# Patient Record
Sex: FEMALE | Race: WHITE | NOT HISPANIC OR LATINO | Employment: UNEMPLOYED | URBAN - NONMETROPOLITAN AREA
[De-identification: names, ages, dates, MRNs, and addresses within clinical notes are randomized per-mention and may not be internally consistent; named-entity substitution may affect disease eponyms.]

---

## 2023-03-07 DIAGNOSIS — E11.9 TYPE 2 DIABETES MELLITUS WITHOUT COMPLICATION, UNSPECIFIED WHETHER LONG TERM INSULIN USE (MULTI): ICD-10-CM

## 2023-03-07 RX ORDER — IBUPROFEN 200 MG
CAPSULE ORAL
COMMUNITY
Start: 2021-10-25

## 2023-03-07 RX ORDER — CLINDAMYCIN PHOSPHATE 10 UG/ML
LOTION TOPICAL 2 TIMES DAILY
COMMUNITY
Start: 2020-03-03

## 2023-03-07 RX ORDER — FOLIC ACID 1 MG/1
1 TABLET ORAL DAILY
COMMUNITY
Start: 2019-10-30

## 2023-03-07 RX ORDER — DIAZEPAM 10 MG/1
TABLET ORAL
COMMUNITY
Start: 2022-06-22 | End: 2024-04-01 | Stop reason: ALTCHOICE

## 2023-03-07 RX ORDER — PREDNISONE 10 MG/1
1 TABLET ORAL DAILY
COMMUNITY
Start: 2018-11-28

## 2023-03-07 RX ORDER — NORETHINDRONE 0.35 MG/1
1 TABLET ORAL DAILY
COMMUNITY
Start: 2018-11-28 | End: 2024-05-08

## 2023-03-07 RX ORDER — LEVOTHYROXINE SODIUM 25 UG/1
1 TABLET ORAL DAILY
COMMUNITY
Start: 2021-10-20 | End: 2023-08-24 | Stop reason: SDUPTHER

## 2023-03-07 RX ORDER — LANOLIN ALCOHOL/MO/W.PET/CERES
1 CREAM (GRAM) TOPICAL DAILY
COMMUNITY
Start: 2018-11-28 | End: 2023-08-24 | Stop reason: SDUPTHER

## 2023-03-07 RX ORDER — ACETAMINOPHEN 500 MG
1 TABLET ORAL DAILY
COMMUNITY
Start: 2018-08-02 | End: 2023-04-11

## 2023-03-07 RX ORDER — METRONIDAZOLE 7.5 MG/G
GEL TOPICAL
COMMUNITY
Start: 2022-03-09

## 2023-03-07 RX ORDER — CHOLECALCIFEROL (VITAMIN D3) 25 MCG
1 TABLET ORAL DAILY
COMMUNITY
Start: 2018-08-02 | End: 2023-04-11 | Stop reason: SDUPTHER

## 2023-03-07 RX ORDER — DULAGLUTIDE 3 MG/.5ML
INJECTION, SOLUTION SUBCUTANEOUS
COMMUNITY
Start: 2021-10-20 | End: 2023-03-07 | Stop reason: SDUPTHER

## 2023-03-07 RX ORDER — PANCRELIPASE LIPASE, PANCRELIPASE PROTEASE, PANCRELIPASE AMYLASE 40000; 126000; 168000 [USP'U]/1; [USP'U]/1; [USP'U]/1
CAPSULE, DELAYED RELEASE ORAL
COMMUNITY
Start: 2020-07-23

## 2023-03-07 RX ORDER — MAGNESIUM CHLORIDE 64 MG
TABLET, DELAYED RELEASE (ENTERIC COATED) ORAL
COMMUNITY

## 2023-03-07 RX ORDER — ONDANSETRON HYDROCHLORIDE 8 MG/1
TABLET, FILM COATED ORAL 3 TIMES DAILY PRN
COMMUNITY
Start: 2019-07-18 | End: 2024-01-09 | Stop reason: SDUPTHER

## 2023-03-07 RX ORDER — OMEPRAZOLE 20 MG/1
CAPSULE, DELAYED RELEASE ORAL
COMMUNITY
End: 2023-04-11 | Stop reason: SDUPTHER

## 2023-03-07 RX ORDER — DULOXETIN HYDROCHLORIDE 60 MG/1
CAPSULE, DELAYED RELEASE ORAL
COMMUNITY
Start: 2020-04-28 | End: 2024-01-08 | Stop reason: SDUPTHER

## 2023-03-07 RX ORDER — CYCLOBENZAPRINE HCL 10 MG
TABLET ORAL
COMMUNITY
Start: 2022-01-27

## 2023-03-07 RX ORDER — SPIRONOLACTONE 25 MG/1
1 TABLET ORAL DAILY
COMMUNITY
Start: 2019-07-18 | End: 2023-04-11 | Stop reason: SDUPTHER

## 2023-03-07 RX ORDER — MYCOPHENOLATE MOFETIL 500 MG/1
TABLET ORAL
COMMUNITY
Start: 2020-10-25

## 2023-03-07 RX ORDER — METFORMIN HYDROCHLORIDE EXTENDED-RELEASE TABLETS 1000 MG/1
TABLET, FILM COATED, EXTENDED RELEASE ORAL 2 TIMES DAILY
COMMUNITY
End: 2023-08-24 | Stop reason: SDUPTHER

## 2023-03-07 RX ORDER — DULAGLUTIDE 3 MG/.5ML
INJECTION, SOLUTION SUBCUTANEOUS
Qty: 8 ML | Refills: 0 | Status: SHIPPED | OUTPATIENT
Start: 2023-03-09 | End: 2023-08-16 | Stop reason: SDUPTHER

## 2023-03-07 RX ORDER — DAPAGLIFLOZIN 5 MG/1
1 TABLET, FILM COATED ORAL DAILY
COMMUNITY
Start: 2022-05-18

## 2023-03-07 RX ORDER — METFORMIN HYDROCHLORIDE 500 MG/1
2 TABLET, FILM COATED, EXTENDED RELEASE ORAL
COMMUNITY
End: 2024-06-01 | Stop reason: WASHOUT

## 2023-03-07 RX ORDER — TOPIRAMATE 100 MG/1
TABLET, FILM COATED ORAL 2 TIMES DAILY
COMMUNITY
Start: 2021-12-07

## 2023-03-07 RX ORDER — MECLIZINE HCL 12.5 MG 12.5 MG/1
TABLET ORAL
COMMUNITY
Start: 2022-07-12

## 2023-03-07 NOTE — TELEPHONE ENCOUNTER
Pt called requesting a refill on her Trulicity.  Stated that her morning BS has been 100-115 with a low of 87; evening BS has been 120-150.  Rx proposed for 1.5mg pens; 2 pens one time weekly.

## 2023-03-13 DIAGNOSIS — E11.9 TYPE 2 DIABETES MELLITUS WITHOUT COMPLICATION, UNSPECIFIED WHETHER LONG TERM INSULIN USE (MULTI): ICD-10-CM

## 2023-04-11 DIAGNOSIS — E78.5 MILD HYPERLIPIDEMIA: ICD-10-CM

## 2023-04-11 DIAGNOSIS — E55.9 VITAMIN D DEFICIENCY: ICD-10-CM

## 2023-04-11 DIAGNOSIS — K21.9 CHRONIC GERD: ICD-10-CM

## 2023-04-11 DIAGNOSIS — I10 ESSENTIAL HYPERTENSION: ICD-10-CM

## 2023-04-11 RX ORDER — OMEPRAZOLE 20 MG/1
20 CAPSULE, DELAYED RELEASE ORAL DAILY
Qty: 90 CAPSULE | Refills: 0 | Status: SHIPPED | OUTPATIENT
Start: 2023-04-11 | End: 2023-08-24 | Stop reason: SDUPTHER

## 2023-04-11 RX ORDER — ATORVASTATIN CALCIUM 20 MG/1
20 TABLET, FILM COATED ORAL NIGHTLY
Qty: 90 TABLET | Refills: 0 | Status: SHIPPED | OUTPATIENT
Start: 2023-04-11 | End: 2023-10-12 | Stop reason: SDUPTHER

## 2023-04-11 RX ORDER — CHOLECALCIFEROL (VITAMIN D3) 25 MCG
25 TABLET ORAL DAILY
Qty: 90 TABLET | Refills: 0 | Status: SHIPPED | OUTPATIENT
Start: 2023-04-11

## 2023-04-11 RX ORDER — ATORVASTATIN CALCIUM 20 MG/1
1 TABLET, FILM COATED ORAL NIGHTLY
COMMUNITY
Start: 2021-10-20 | End: 2023-04-11 | Stop reason: SDUPTHER

## 2023-04-11 RX ORDER — SPIRONOLACTONE 25 MG/1
25 TABLET ORAL DAILY
Qty: 90 TABLET | Refills: 0 | Status: SHIPPED | OUTPATIENT
Start: 2023-04-11 | End: 2023-08-24 | Stop reason: SDUPTHER

## 2023-04-19 LAB
ALANINE AMINOTRANSFERASE (SGPT) (U/L) IN SER/PLAS: NORMAL
ALBUMIN (G/DL) IN SER/PLAS: NORMAL
ALKALINE PHOSPHATASE (U/L) IN SER/PLAS: NORMAL
ANION GAP IN SER/PLAS: NORMAL
ANTI-DNA (DS): <1 IU/ML
ASPARTATE AMINOTRANSFERASE (SGOT) (U/L) IN SER/PLAS: NORMAL
BASOPHILS (10*3/UL) IN BLOOD BY AUTOMATED COUNT: NORMAL
BASOPHILS/100 LEUKOCYTES IN BLOOD BY AUTOMATED COUNT: NORMAL
BILIRUBIN TOTAL (MG/DL) IN SER/PLAS: NORMAL
C REACTIVE PROTEIN (MG/L) IN SER/PLAS: 1.29 MG/DL
CALCIUM (MG/DL) IN SER/PLAS: NORMAL
CARBON DIOXIDE, TOTAL (MMOL/L) IN SER/PLAS: NORMAL
CHLORIDE (MMOL/L) IN SER/PLAS: NORMAL
COMPLEMENT C3 (MG/DL) IN SER/PLAS: 195 MG/DL (ref 87–200)
COMPLEMENT C4 (MG/DL) IN SER/PLAS: 61 MG/DL (ref 10–50)
CREATININE (MG/DL) IN SER/PLAS: NORMAL
EOSINOPHILS (10*3/UL) IN BLOOD BY AUTOMATED COUNT: NORMAL
EOSINOPHILS/100 LEUKOCYTES IN BLOOD BY AUTOMATED COUNT: NORMAL
ERYTHROCYTE DISTRIBUTION WIDTH (RATIO) BY AUTOMATED COUNT: NORMAL
ERYTHROCYTE MEAN CORPUSCULAR HEMOGLOBIN CONCENTRATION (G/DL) BY AUTOMATED: NORMAL
ERYTHROCYTE MEAN CORPUSCULAR VOLUME (FL) BY AUTOMATED COUNT: NORMAL
ERYTHROCYTES (10*6/UL) IN BLOOD BY AUTOMATED COUNT: NORMAL
GFR FEMALE: NORMAL
GFR MALE: NORMAL
GLUCOSE (MG/DL) IN SER/PLAS: NORMAL
HEMATOCRIT (%) IN BLOOD BY AUTOMATED COUNT: NORMAL
HEMOGLOBIN (G/DL) IN BLOOD: NORMAL
IMMATURE GRANULOCYTES/100 LEUKOCYTES IN BLOOD BY AUTOMATED COUNT: NORMAL
LEUKOCYTES (10*3/UL) IN BLOOD BY AUTOMATED COUNT: NORMAL
LYMPHOCYTES (10*3/UL) IN BLOOD BY AUTOMATED COUNT: NORMAL
LYMPHOCYTES/100 LEUKOCYTES IN BLOOD BY AUTOMATED COUNT: NORMAL
MANUAL DIFFERENTIAL Y/N: NORMAL
MONOCYTES (10*3/UL) IN BLOOD BY AUTOMATED COUNT: NORMAL
MONOCYTES/100 LEUKOCYTES IN BLOOD BY AUTOMATED COUNT: NORMAL
NEUTROPHILS (10*3/UL) IN BLOOD BY AUTOMATED COUNT: NORMAL
NEUTROPHILS/100 LEUKOCYTES IN BLOOD BY AUTOMATED COUNT: NORMAL
PLATELETS (10*3/UL) IN BLOOD AUTOMATED COUNT: NORMAL
POTASSIUM (MMOL/L) IN SER/PLAS: NORMAL
PROTEIN TOTAL: NORMAL
SEDIMENTATION RATE, ERYTHROCYTE: 22 MM/H (ref 0–20)
SODIUM (MMOL/L) IN SER/PLAS: NORMAL
UREA NITROGEN (MG/DL) IN SER/PLAS: NORMAL

## 2023-06-09 ENCOUNTER — HOSPITAL ENCOUNTER (OUTPATIENT)
Dept: DATA CONVERSION | Facility: HOSPITAL | Age: 43
End: 2023-06-09
Attending: PAIN MEDICINE | Admitting: PAIN MEDICINE

## 2023-06-09 DIAGNOSIS — E11.65 TYPE 2 DIABETES MELLITUS WITH HYPERGLYCEMIA (MULTI): ICD-10-CM

## 2023-06-09 DIAGNOSIS — R52 PAIN, UNSPECIFIED: ICD-10-CM

## 2023-06-09 DIAGNOSIS — E03.9 HYPOTHYROIDISM, UNSPECIFIED: ICD-10-CM

## 2023-06-09 DIAGNOSIS — K21.9 GASTRO-ESOPHAGEAL REFLUX DISEASE WITHOUT ESOPHAGITIS: ICD-10-CM

## 2023-06-09 DIAGNOSIS — E78.5 HYPERLIPIDEMIA, UNSPECIFIED: ICD-10-CM

## 2023-06-09 DIAGNOSIS — I10 ESSENTIAL (PRIMARY) HYPERTENSION: ICD-10-CM

## 2023-06-09 DIAGNOSIS — M32.9 SYSTEMIC LUPUS ERYTHEMATOSUS, UNSPECIFIED (MULTI): ICD-10-CM

## 2023-06-09 DIAGNOSIS — G47.33 OBSTRUCTIVE SLEEP APNEA (ADULT) (PEDIATRIC): ICD-10-CM

## 2023-06-09 DIAGNOSIS — M47.817 SPONDYLOSIS WITHOUT MYELOPATHY OR RADICULOPATHY, LUMBOSACRAL REGION: ICD-10-CM

## 2023-06-09 DIAGNOSIS — E55.9 VITAMIN D DEFICIENCY, UNSPECIFIED: ICD-10-CM

## 2023-08-16 DIAGNOSIS — E11.9 TYPE 2 DIABETES MELLITUS WITHOUT COMPLICATION, UNSPECIFIED WHETHER LONG TERM INSULIN USE (MULTI): ICD-10-CM

## 2023-08-16 RX ORDER — DULAGLUTIDE 3 MG/.5ML
INJECTION, SOLUTION SUBCUTANEOUS
Qty: 8 ML | Refills: 0 | Status: SHIPPED | OUTPATIENT
Start: 2023-08-16 | End: 2023-08-24 | Stop reason: SDUPTHER

## 2023-08-24 ENCOUNTER — LAB (OUTPATIENT)
Dept: LAB | Facility: LAB | Age: 43
End: 2023-08-24
Payer: MEDICARE

## 2023-08-24 ENCOUNTER — OFFICE VISIT (OUTPATIENT)
Dept: PRIMARY CARE | Facility: CLINIC | Age: 43
End: 2023-08-24
Payer: MEDICARE

## 2023-08-24 VITALS
WEIGHT: 230 LBS | BODY MASS INDEX: 40.75 KG/M2 | SYSTOLIC BLOOD PRESSURE: 104 MMHG | HEART RATE: 64 BPM | HEIGHT: 63 IN | DIASTOLIC BLOOD PRESSURE: 70 MMHG

## 2023-08-24 DIAGNOSIS — R10.2 SUPRAPUBIC ABDOMINAL PAIN: ICD-10-CM

## 2023-08-24 DIAGNOSIS — R14.2 BELCHING: ICD-10-CM

## 2023-08-24 DIAGNOSIS — E66.01 CLASS 3 SEVERE OBESITY DUE TO EXCESS CALORIES WITH SERIOUS COMORBIDITY AND BODY MASS INDEX (BMI) OF 40.0 TO 44.9 IN ADULT (MULTI): ICD-10-CM

## 2023-08-24 DIAGNOSIS — E03.9 HYPOTHYROIDISM, ADULT: Primary | ICD-10-CM

## 2023-08-24 DIAGNOSIS — Z00.00 MEDICARE ANNUAL WELLNESS VISIT, SUBSEQUENT: Primary | ICD-10-CM

## 2023-08-24 DIAGNOSIS — E53.8 B12 DEFICIENCY: ICD-10-CM

## 2023-08-24 DIAGNOSIS — E11.9 TYPE 2 DIABETES MELLITUS WITHOUT COMPLICATION, UNSPECIFIED WHETHER LONG TERM INSULIN USE (MULTI): ICD-10-CM

## 2023-08-24 DIAGNOSIS — D50.8 OTHER IRON DEFICIENCY ANEMIA: ICD-10-CM

## 2023-08-24 DIAGNOSIS — F33.1 MODERATE EPISODE OF RECURRENT MAJOR DEPRESSIVE DISORDER (MULTI): ICD-10-CM

## 2023-08-24 DIAGNOSIS — M32.9 SYSTEMIC LUPUS ERYTHEMATOSUS, UNSPECIFIED SLE TYPE, UNSPECIFIED ORGAN INVOLVEMENT STATUS (MULTI): ICD-10-CM

## 2023-08-24 DIAGNOSIS — K21.9 CHRONIC GERD: ICD-10-CM

## 2023-08-24 DIAGNOSIS — I10 ESSENTIAL HYPERTENSION: ICD-10-CM

## 2023-08-24 PROBLEM — E11.65 UNCONTROLLED TYPE 2 DIABETES MELLITUS WITH HYPERGLYCEMIA (MULTI): Status: ACTIVE | Noted: 2023-08-24

## 2023-08-24 PROBLEM — E61.1 IRON DEFICIENCY: Status: ACTIVE | Noted: 2023-08-24

## 2023-08-24 PROBLEM — K86.1 CHRONIC PANCREATITIS (MULTI): Status: RESOLVED | Noted: 2023-08-24 | Resolved: 2023-08-24

## 2023-08-24 PROBLEM — D72.829 LEUKOCYTOSIS: Status: ACTIVE | Noted: 2023-08-24

## 2023-08-24 PROBLEM — D50.9 IRON DEFICIENCY ANEMIA: Status: ACTIVE | Noted: 2023-08-24

## 2023-08-24 PROBLEM — K42.9 UMBILICAL HERNIA: Status: RESOLVED | Noted: 2023-08-24 | Resolved: 2023-08-24

## 2023-08-24 PROBLEM — M47.817 LUMBOSACRAL SPONDYLOSIS: Status: ACTIVE | Noted: 2023-08-24

## 2023-08-24 PROBLEM — F33.42 RECURRENT MAJOR DEPRESSIVE DISORDER, IN FULL REMISSION (CMS-HCC): Status: ACTIVE | Noted: 2023-08-24

## 2023-08-24 PROBLEM — K86.89 SECONDARY PANCREATIC INSUFFICIENCY (HHS-HCC): Status: RESOLVED | Noted: 2023-08-24 | Resolved: 2023-08-24

## 2023-08-24 PROBLEM — G47.33 OSA ON CPAP: Status: ACTIVE | Noted: 2023-08-24

## 2023-08-24 PROBLEM — M70.62 GREATER TROCHANTERIC BURSITIS, LEFT: Status: RESOLVED | Noted: 2023-08-24 | Resolved: 2023-08-24

## 2023-08-24 PROBLEM — K52.9 CHRONIC DIARRHEA: Status: ACTIVE | Noted: 2023-08-24

## 2023-08-24 PROBLEM — G43.009 MIGRAINE WITHOUT AURA AND WITHOUT STATUS MIGRAINOSUS, NOT INTRACTABLE: Status: ACTIVE | Noted: 2023-08-24

## 2023-08-24 PROBLEM — M54.17 LUMBOSACRAL RADICULOPATHY: Status: ACTIVE | Noted: 2023-08-24

## 2023-08-24 PROBLEM — E66.813 CLASS 3 SEVERE OBESITY DUE TO EXCESS CALORIES WITH SERIOUS COMORBIDITY AND BODY MASS INDEX (BMI) OF 40.0 TO 44.9 IN ADULT: Status: ACTIVE | Noted: 2023-08-24

## 2023-08-24 PROBLEM — L73.2 HIDRADENITIS SUPPURATIVA: Status: ACTIVE | Noted: 2022-03-09

## 2023-08-24 PROBLEM — M79.7 FIBROMYALGIA: Status: ACTIVE | Noted: 2023-08-24

## 2023-08-24 PROBLEM — E11.65 TYPE 2 DIABETES MELLITUS WITH HYPERGLYCEMIA, WITHOUT LONG-TERM CURRENT USE OF INSULIN (MULTI): Status: ACTIVE | Noted: 2022-05-17

## 2023-08-24 PROBLEM — I47.19 ATRIAL TACHYCARDIA (CMS-HCC): Status: ACTIVE | Noted: 2023-08-24

## 2023-08-24 PROBLEM — M46.1 SACROILIITIS (CMS-HCC): Status: RESOLVED | Noted: 2023-08-24 | Resolved: 2023-08-24

## 2023-08-24 PROBLEM — J32.9 CHRONIC SINUSITIS: Status: RESOLVED | Noted: 2023-08-24 | Resolved: 2023-08-24

## 2023-08-24 LAB
ALANINE AMINOTRANSFERASE (SGPT) (U/L) IN SER/PLAS: 12 U/L (ref 7–45)
ALBUMIN (G/DL) IN SER/PLAS: 4.4 G/DL (ref 3.4–5)
ALKALINE PHOSPHATASE (U/L) IN SER/PLAS: 94 U/L (ref 33–110)
ANION GAP IN SER/PLAS: 14 MMOL/L (ref 10–20)
ASPARTATE AMINOTRANSFERASE (SGOT) (U/L) IN SER/PLAS: 11 U/L (ref 9–39)
BASOPHILS (10*3/UL) IN BLOOD BY AUTOMATED COUNT: 0.06 X10E9/L (ref 0–0.1)
BASOPHILS/100 LEUKOCYTES IN BLOOD BY AUTOMATED COUNT: 0.5 % (ref 0–2)
BILIRUBIN TOTAL (MG/DL) IN SER/PLAS: 0.3 MG/DL (ref 0–1.2)
CALCIUM (MG/DL) IN SER/PLAS: 9.3 MG/DL (ref 8.6–10.3)
CARBON DIOXIDE, TOTAL (MMOL/L) IN SER/PLAS: 23 MMOL/L (ref 21–32)
CHLORIDE (MMOL/L) IN SER/PLAS: 107 MMOL/L (ref 98–107)
COBALAMIN (VITAMIN B12) (PG/ML) IN SER/PLAS: 420 PG/ML (ref 211–911)
CREATININE (MG/DL) IN SER/PLAS: 0.69 MG/DL (ref 0.5–1.05)
EOSINOPHILS (10*3/UL) IN BLOOD BY AUTOMATED COUNT: 0.48 X10E9/L (ref 0–0.7)
EOSINOPHILS/100 LEUKOCYTES IN BLOOD BY AUTOMATED COUNT: 3.9 % (ref 0–6)
ERYTHROCYTE DISTRIBUTION WIDTH (RATIO) BY AUTOMATED COUNT: 14.2 % (ref 11.5–14.5)
ERYTHROCYTE MEAN CORPUSCULAR HEMOGLOBIN CONCENTRATION (G/DL) BY AUTOMATED: 30.3 G/DL (ref 32–36)
ERYTHROCYTE MEAN CORPUSCULAR VOLUME (FL) BY AUTOMATED COUNT: 89 FL (ref 80–100)
ERYTHROCYTES (10*6/UL) IN BLOOD BY AUTOMATED COUNT: 4.92 X10E12/L (ref 4–5.2)
FERRITIN (UG/LL) IN SER/PLAS: 52 UG/L (ref 8–150)
FOLATE (NG/ML) IN SER/PLAS: 6.1 NG/ML
GFR FEMALE: >90 ML/MIN/1.73M2
GLUCOSE (MG/DL) IN SER/PLAS: 119 MG/DL (ref 74–99)
HEMATOCRIT (%) IN BLOOD BY AUTOMATED COUNT: 43.9 % (ref 36–46)
HEMOGLOBIN (G/DL) IN BLOOD: 13.3 G/DL (ref 12–16)
HEMOGLOBIN (PG) IN RETICULOCYTES: 31 PG (ref 28–38)
IMMATURE GRANULOCYTES/100 LEUKOCYTES IN BLOOD BY AUTOMATED COUNT: 0.3 % (ref 0–0.9)
IMMATURE RETIC FRACTION: 15.4 % (ref 0–16)
IRON (UG/DL) IN SER/PLAS: 34 UG/DL (ref 35–150)
IRON BINDING CAPACITY (UG/DL) IN SER/PLAS: 425 UG/DL (ref 240–445)
IRON SATURATION (%) IN SER/PLAS: 8 % (ref 25–45)
LEUKOCYTES (10*3/UL) IN BLOOD BY AUTOMATED COUNT: 12.2 X10E9/L (ref 4.4–11.3)
LYMPHOCYTES (10*3/UL) IN BLOOD BY AUTOMATED COUNT: 2.5 X10E9/L (ref 1.2–4.8)
LYMPHOCYTES/100 LEUKOCYTES IN BLOOD BY AUTOMATED COUNT: 20.5 % (ref 13–44)
MONOCYTES (10*3/UL) IN BLOOD BY AUTOMATED COUNT: 0.67 X10E9/L (ref 0.1–1)
MONOCYTES/100 LEUKOCYTES IN BLOOD BY AUTOMATED COUNT: 5.5 % (ref 2–10)
NEUTROPHILS (10*3/UL) IN BLOOD BY AUTOMATED COUNT: 8.47 X10E9/L (ref 1.2–7.7)
NEUTROPHILS/100 LEUKOCYTES IN BLOOD BY AUTOMATED COUNT: 69.3 % (ref 40–80)
PLATELETS (10*3/UL) IN BLOOD AUTOMATED COUNT: 511 X10E9/L (ref 150–450)
POC APPEARANCE, URINE: ABNORMAL
POC BILIRUBIN, URINE: NEGATIVE
POC BLOOD, URINE: ABNORMAL
POC COLOR, URINE: ABNORMAL
POC GLUCOSE, URINE: ABNORMAL MG/DL
POC KETONES, URINE: NEGATIVE MG/DL
POC LEUKOCYTES, URINE: ABNORMAL
POC NITRITE,URINE: NEGATIVE
POC PH, URINE: 5.5 PH
POC PROTEIN, URINE: ABNORMAL MG/DL
POC SPECIFIC GRAVITY, URINE: 1.02
POC UROBILINOGEN, URINE: 1 EU/DL
POTASSIUM (MMOL/L) IN SER/PLAS: 4.4 MMOL/L (ref 3.5–5.3)
PROTEIN TOTAL: 6.7 G/DL (ref 6.4–8.2)
RETICULOCYTES (10*3/UL) IN BLOOD: 0.08 X10E12/L (ref 0.02–0.08)
RETICULOCYTES/100 ERYTHROCYTES IN BLOOD BY AUTOMATED COUNT: 1.7 % (ref 0.5–2)
SODIUM (MMOL/L) IN SER/PLAS: 140 MMOL/L (ref 136–145)
UREA NITROGEN (MG/DL) IN SER/PLAS: 9 MG/DL (ref 6–23)

## 2023-08-24 PROCEDURE — 82728 ASSAY OF FERRITIN: CPT

## 2023-08-24 PROCEDURE — 83550 IRON BINDING TEST: CPT

## 2023-08-24 PROCEDURE — 81003 URINALYSIS AUTO W/O SCOPE: CPT | Performed by: NURSE PRACTITIONER

## 2023-08-24 PROCEDURE — 36415 COLL VENOUS BLD VENIPUNCTURE: CPT

## 2023-08-24 PROCEDURE — 82607 VITAMIN B-12: CPT

## 2023-08-24 PROCEDURE — 3078F DIAST BP <80 MM HG: CPT | Performed by: NURSE PRACTITIONER

## 2023-08-24 PROCEDURE — G0439 PPPS, SUBSEQ VISIT: HCPCS | Performed by: NURSE PRACTITIONER

## 2023-08-24 PROCEDURE — 82746 ASSAY OF FOLIC ACID SERUM: CPT

## 2023-08-24 PROCEDURE — 99214 OFFICE O/P EST MOD 30 MIN: CPT | Performed by: NURSE PRACTITIONER

## 2023-08-24 PROCEDURE — 3074F SYST BP LT 130 MM HG: CPT | Performed by: NURSE PRACTITIONER

## 2023-08-24 PROCEDURE — 80053 COMPREHEN METABOLIC PANEL: CPT

## 2023-08-24 PROCEDURE — 1123F ACP DISCUSS/DSCN MKR DOCD: CPT | Performed by: NURSE PRACTITIONER

## 2023-08-24 PROCEDURE — 85025 COMPLETE CBC W/AUTO DIFF WBC: CPT

## 2023-08-24 PROCEDURE — 87086 URINE CULTURE/COLONY COUNT: CPT

## 2023-08-24 PROCEDURE — 3008F BODY MASS INDEX DOCD: CPT | Performed by: NURSE PRACTITIONER

## 2023-08-24 PROCEDURE — 83540 ASSAY OF IRON: CPT

## 2023-08-24 PROCEDURE — 85045 AUTOMATED RETICULOCYTE COUNT: CPT

## 2023-08-24 RX ORDER — OMEPRAZOLE 20 MG/1
20 CAPSULE, DELAYED RELEASE ORAL DAILY
Qty: 90 CAPSULE | Refills: 1 | Status: SHIPPED | OUTPATIENT
Start: 2023-08-24

## 2023-08-24 RX ORDER — SPIRONOLACTONE 25 MG/1
25 TABLET ORAL DAILY
Qty: 90 TABLET | Refills: 1 | Status: SHIPPED | OUTPATIENT
Start: 2023-08-24

## 2023-08-24 RX ORDER — LEVOTHYROXINE SODIUM 25 UG/1
25 TABLET ORAL DAILY
Qty: 90 TABLET | Refills: 1 | Status: SHIPPED | OUTPATIENT
Start: 2023-08-24

## 2023-08-24 RX ORDER — DULAGLUTIDE 3 MG/.5ML
INJECTION, SOLUTION SUBCUTANEOUS
Qty: 8 ML | Refills: 0 | Status: SHIPPED | OUTPATIENT
Start: 2023-08-24 | End: 2023-11-30 | Stop reason: SDUPTHER

## 2023-08-24 RX ORDER — LANOLIN ALCOHOL/MO/W.PET/CERES
1000 CREAM (GRAM) TOPICAL DAILY
Qty: 90 TABLET | Refills: 1 | Status: SHIPPED | OUTPATIENT
Start: 2023-08-24

## 2023-08-24 RX ORDER — SIMETHICONE 80 MG
80 TABLET,CHEWABLE ORAL EVERY 6 HOURS PRN
Qty: 30 TABLET | Refills: 1 | Status: SHIPPED | OUTPATIENT
Start: 2023-08-24

## 2023-08-24 ASSESSMENT — ENCOUNTER SYMPTOMS
COLOR CHANGE: 0
LIGHT-HEADEDNESS: 0
PALPITATIONS: 0
BLOOD IN STOOL: 1
NAUSEA: 1
CONSTIPATION: 0
DIZZINESS: 0
ABDOMINAL PAIN: 1
FATIGUE: 0
SHORTNESS OF BREATH: 0
DIARRHEA: 0
DYSURIA: 0
RECTAL PAIN: 0
ANAL BLEEDING: 1
MYALGIAS: 0
VOMITING: 0
ABDOMINAL DISTENTION: 0
CHEST TIGHTNESS: 0
ARTHRALGIAS: 0
HEADACHES: 0

## 2023-08-24 ASSESSMENT — ACTIVITIES OF DAILY LIVING (ADL)
BATHING: INDEPENDENT
MANAGING_FINANCES: INDEPENDENT
GROCERY_SHOPPING: INDEPENDENT
TAKING_MEDICATION: INDEPENDENT
DRESSING: INDEPENDENT
DOING_HOUSEWORK: INDEPENDENT

## 2023-08-24 ASSESSMENT — PATIENT HEALTH QUESTIONNAIRE - PHQ9
4. FEELING TIRED OR HAVING LITTLE ENERGY: NEARLY EVERY DAY
2. FEELING DOWN, DEPRESSED OR HOPELESS: MORE THAN HALF THE DAYS
5. POOR APPETITE OR OVEREATING: NEARLY EVERY DAY
6. FEELING BAD ABOUT YOURSELF - OR THAT YOU ARE A FAILURE OR HAVE LET YOURSELF OR YOUR FAMILY DOWN: SEVERAL DAYS
7. TROUBLE CONCENTRATING ON THINGS, SUCH AS READING THE NEWSPAPER OR WATCHING TELEVISION: MORE THAN HALF THE DAYS
8. MOVING OR SPEAKING SO SLOWLY THAT OTHER PEOPLE COULD HAVE NOTICED. OR THE OPPOSITE, BEING SO FIGETY OR RESTLESS THAT YOU HAVE BEEN MOVING AROUND A LOT MORE THAN USUAL: MORE THAN HALF THE DAYS
SUM OF ALL RESPONSES TO PHQ QUESTIONS 1-9: 17
SUM OF ALL RESPONSES TO PHQ9 QUESTIONS 1 AND 2: 4
1. LITTLE INTEREST OR PLEASURE IN DOING THINGS: MORE THAN HALF THE DAYS
9. THOUGHTS THAT YOU WOULD BE BETTER OFF DEAD, OR OF HURTING YOURSELF: NOT AT ALL
3. TROUBLE FALLING OR STAYING ASLEEP OR SLEEPING TOO MUCH: MORE THAN HALF THE DAYS

## 2023-08-24 NOTE — PROGRESS NOTES
"Subjective   Reason for Visit: Ayde Ramírez is an 43 y.o. female here for a Medicare Wellness visit.     Past Medical, Surgical, and Family History reviewed and updated in chart.    Reviewed all medications by prescribing practitioner or clinical pharmacist (such as prescriptions, OTCs, herbal therapies and supplements) and documented in the medical record.    HPI  Ayde here for W visit and has other concerns today.          Also has complaints of blood in stool. Had scope in 2022-had a rectal tear. Not having problems with constipation. Has noticed blood clots in toilet. Is currently on period, but this happened pre menstrual cycle. Bright red blood. Denies any hematemesis.     Has appt with Dr. Joya in October 26th.     Was seen At [facility] on 8/10 and was told she had a UTI. Was given an ATB x 5 days. Never had a urinary symptoms except urinary leakage and had abdominal pain RLQ.     Patient Care Team:  MÓNICA Neves-CNP as PCP - General (Family Medicine)  Karen Celis MD as PCP - [employer] PCP  DILIA Sanderson as Nurse Practitioner (Hematology and Oncology)     Review of Systems   Constitutional:  Negative for fatigue.   Respiratory:  Negative for chest tightness and shortness of breath.    Cardiovascular:  Negative for chest pain, palpitations and leg swelling.   Gastrointestinal:  Positive for abdominal pain, anal bleeding, blood in stool and nausea (intermittent). Negative for abdominal distention, constipation, diarrhea, rectal pain and vomiting.   Genitourinary:  Positive for pelvic pain. Negative for dysuria.   Musculoskeletal:  Negative for arthralgias and myalgias.   Skin:  Negative for color change.   Neurological:  Negative for dizziness, light-headedness and headaches.       Objective   Vitals:  /70   Pulse 64   Ht 1.6 m (5' 3\")   Wt 104 kg (230 lb)   BMI 40.74 kg/m²       Physical Exam  Vitals and nursing note reviewed.   Constitutional:       " Appearance: Normal appearance.   Cardiovascular:      Rate and Rhythm: Normal rate and regular rhythm.      Heart sounds: Normal heart sounds.   Pulmonary:      Effort: Pulmonary effort is normal.      Breath sounds: Normal breath sounds.   Abdominal:      General: Bowel sounds are normal.      Palpations: Abdomen is soft.      Tenderness: There is abdominal tenderness in the suprapubic area. There is right CVA tenderness. There is no guarding or rebound.          Comments: Right mid abdominal pain   Skin:     General: Skin is warm and dry.   Neurological:      Mental Status: She is alert and oriented to person, place, and time.   Psychiatric:         Mood and Affect: Mood normal.         Behavior: Behavior normal.         Thought Content: Thought content normal.         Judgment: Judgment normal.         Assessment/Plan   Problem List Items Addressed This Visit       Moderate episode of recurrent major depressive disorder (CMS/HCC)    Current Assessment & Plan     Going through stressors with her mother in law and her own health conditions.  She currently is taking Cymbalta 60 mg daily         Systemic lupus erythematosus (CMS/HCC)    Current Assessment & Plan     Managed by Rheumatology, Dr. Celis         Class 3 severe obesity due to excess calories with serious comorbidity and body mass index (BMI) of 40.0 to 44.9 in adult (CMS/HCC)    Current Assessment & Plan     Pt advised to institute a healthy, well-balanced meal with portion control and to exercise daily for at least 30-60 minutes.         Iron deficiency anemia    Relevant Orders    CBC and Auto Differential    Comprehensive Metabolic Panel    Iron and TIBC    Reticulocytes    Vitamin B12    Folate    Ferritin     Other Visit Diagnoses       Medicare annual wellness visit, subsequent    -  Primary    Suprapubic abdominal pain        Relevant Orders    Urine Culture    POCT UA Automated manually resulted (Completed)    Belching        Relevant  Medications    simethicone (Gas Relief, simethicone,) 80 mg chewable tablet               Follow up in December for chronic care follow up. We will call her with results of labs, and urine.

## 2023-08-24 NOTE — ASSESSMENT & PLAN NOTE
Going through stressors with her mother in law and her own health conditions.  She currently is taking Cymbalta 60 mg daily

## 2023-08-24 NOTE — ACP (ADVANCE CARE PLANNING)
Confirming Previous Code Status:   Advance Care Planning Note     Discussion Date: 08/24/23   Discussion Participants: patient    The patient wishes to discuss Advance Care Planning today and the following is a brief summary of our discussion.     Patient has capacity to make their own medical decisions: Yes  Health Care Agent/Surrogate Decision Maker documented in chart: Yes - marilin Ramírez- spouse    Documents on file and valid:  Advance Directive/Living Will: No   Health Care Power of : No  Treatment Decisions  Goals of Care: survival is paramount regardless of prognosis, treatment outcome, or burden       Time Statement: Total face to face time spent on advance care planning was 1 minutes with 1 minutes spent in counseling, including the explanation.    MÓNICA Neves-CNP  8/24/2023 9:39 AM

## 2023-08-24 NOTE — PROGRESS NOTES
Patient here today for rectal bleeding, duration on week    seen when wiping, in toilet, and in stool

## 2023-08-24 NOTE — PATIENT INSTRUCTIONS
BMI was above normal measurement. Current weight: 104 kg (230 lb)  Weight change since last visit (-) denotes wt loss -5 lbs   Weight loss needed to achieve BMI 25: 89.2 Lbs  Weight loss needed to achieve BMI 30: 61 Lbs  Provided instructions on dietary changes  Provided instructions on exercise  Advised to Increase physical activity

## 2023-08-25 LAB — URINE CULTURE: NORMAL

## 2023-08-28 NOTE — RESULT ENCOUNTER NOTE
No bacterial growth in your urine. Blood counts aren't low, but Also, looks like your iron levels are low again. I will get a copy of your labs sent over to [facility]. Please make an appt for follow up if you haven't done so. If you are noticing more blood in your stool, let me know we can try to get your appt moved up with Dr. Perry. But it looks like your counts are stable at this time.

## 2023-09-07 VITALS — BODY MASS INDEX: 39.26 KG/M2 | WEIGHT: 221.56 LBS | HEIGHT: 63 IN

## 2023-09-29 RX ORDER — HEPARIN SODIUM,PORCINE/PF 10 UNIT/ML
50 SYRINGE (ML) INTRAVENOUS AS NEEDED
Status: CANCELLED | OUTPATIENT
Start: 2023-10-06

## 2023-09-29 RX ORDER — HEPARIN 100 UNIT/ML
500 SYRINGE INTRAVENOUS AS NEEDED
Status: CANCELLED | OUTPATIENT
Start: 2023-10-06

## 2023-10-02 NOTE — OP NOTE
PROCEDURE DETAILS    Preoperative Diagnosis:  Spondylosis without myelopathy or radiculopathy, lumbosacral region, M47.817    Postoperative Diagnosis:  Spondylosis without myelopathy or radiculopathy, lumbosacral region, M47.817    Surgeon: Kaz Zaman  Resident/Fellow/Other Assistant: None of these were associated with this case    Procedure:  1. BILATERAL L3-5 MBB    Anesthesia: No anesthesiologist associated with this case  Estimated Blood Loss: 0  Findings: NA  Additional Details: The patient has a greater than 3-month history of severe bilateral axial low back pain.  On exam the patient has bilateral lumbar paraspinal tenderness exacerbated with  facet loading at the levels in question.  The imaging is notable for spondylosis and facet arthropathy at the levels in question.  The patient does not have evidence of neurogenic claudication or radiculopathy.  The patient has previously failed 6 weeks  of conservative management with medications and exercise therapy.  The patient is compliant with home exercises for this issue.  The pain significantly impairs the patient's function.  She cannot sleep for 4 hours, lift anything other than very light  weights, or stand or walk for more than 5 minutes due to the pain.  The patient does not desire surgery. or spondylosis and facet arthropathy at the levels in question.  The patient has had an initial set of diagnostic injections with greater than 80%  immediate pain relief and functional improvement which lasted for 3-4 hours which is consistent with the duration of action of bupivacaine during which time the pain could not be reproduced.  During this time the patient could perform previously painful  movements without pain.         Operative Report:   Procedure: Bilateral diagnostic lumbar medial branch blocks under fluoroscopic guidance of the medial branches of L3-L5 covering the facet joints from L4-S1  Diagnosis: Lumbosacral spondylosis  Solution: 3 mL  of bupivacaine 0.5% total. 0.5 mL per branch  Anesthesia: Local  Complications: None    After informed consent was obtained the patient was brought to the OR and placed in the prone position. The area in question was prepped and draped  in sterile fashion. An ipsilateral oblique fluoroscopic view of the lumbosacral spine was obtained and a 25-gauge quincke needle was inserted into the skin and advanced to the junction of the superior articular process and transverse process of the L4  and L5 vertebrae and to the sacral ala on the left and right under intermittent fluoroscopic guidance. Proper needle position was confirmed by AP and ipsilateral oblique fluoroscopic views.  Aspiration at each site was negative. The local anesthetic solution  was injected at each site. The 6 needles were removed. Bleeding was nil. The patient tolerated the procedure well and was transferred to the recovery room in good condition.                        Attestation:   Note Completion:  Attending Attestation I performed the procedure without a resident         Electronic Signatures:  Kaz Zaman)  (Signed 09-Jun-2023 20:32)   Authored: Post-Operative Note, Chart Review, Note Completion      Last Updated: 09-Jun-2023 20:32 by Kaz Zaman)

## 2023-10-05 RX ORDER — AMLODIPINE BESYLATE 5 MG/1
5 TABLET ORAL DAILY
COMMUNITY
Start: 2023-08-25 | End: 2023-11-22

## 2023-10-05 RX ORDER — ATENOLOL 50 MG/1
3 TABLET ORAL DAILY
COMMUNITY
End: 2024-05-10 | Stop reason: SDUPTHER

## 2023-10-06 ENCOUNTER — INFUSION (OUTPATIENT)
Dept: HEMATOLOGY/ONCOLOGY | Facility: CLINIC | Age: 43
End: 2023-10-06
Payer: COMMERCIAL

## 2023-10-06 VITALS
TEMPERATURE: 97.7 F | HEART RATE: 90 BPM | BODY MASS INDEX: 39.25 KG/M2 | OXYGEN SATURATION: 98 % | DIASTOLIC BLOOD PRESSURE: 80 MMHG | WEIGHT: 221.56 LBS | SYSTOLIC BLOOD PRESSURE: 117 MMHG | RESPIRATION RATE: 16 BRPM

## 2023-10-06 DIAGNOSIS — E61.1 IRON DEFICIENCY: ICD-10-CM

## 2023-10-06 PROCEDURE — 2500000004 HC RX 250 GENERAL PHARMACY W/ HCPCS (ALT 636 FOR OP/ED)

## 2023-10-06 PROCEDURE — 96365 THER/PROPH/DIAG IV INF INIT: CPT

## 2023-10-06 RX ORDER — DIPHENHYDRAMINE HYDROCHLORIDE 50 MG/ML
50 INJECTION INTRAMUSCULAR; INTRAVENOUS AS NEEDED
Status: CANCELLED | OUTPATIENT
Start: 2023-10-06

## 2023-10-06 RX ORDER — EPINEPHRINE 0.3 MG/.3ML
0.3 INJECTION SUBCUTANEOUS EVERY 5 MIN PRN
Status: DISCONTINUED | OUTPATIENT
Start: 2023-10-06 | End: 2023-10-06 | Stop reason: HOSPADM

## 2023-10-06 RX ORDER — FAMOTIDINE 10 MG/ML
20 INJECTION INTRAVENOUS ONCE AS NEEDED
Status: DISCONTINUED | OUTPATIENT
Start: 2023-10-06 | End: 2023-10-06 | Stop reason: HOSPADM

## 2023-10-06 RX ORDER — EPINEPHRINE 0.3 MG/.3ML
0.3 INJECTION SUBCUTANEOUS EVERY 5 MIN PRN
Status: CANCELLED | OUTPATIENT
Start: 2023-10-06

## 2023-10-06 RX ORDER — ALBUTEROL SULFATE 0.83 MG/ML
3 SOLUTION RESPIRATORY (INHALATION) AS NEEDED
Status: DISCONTINUED | OUTPATIENT
Start: 2023-10-06 | End: 2023-10-06 | Stop reason: HOSPADM

## 2023-10-06 RX ORDER — FAMOTIDINE 10 MG/ML
20 INJECTION INTRAVENOUS ONCE AS NEEDED
Status: CANCELLED | OUTPATIENT
Start: 2023-10-06

## 2023-10-06 RX ORDER — HEPARIN SODIUM,PORCINE/PF 10 UNIT/ML
50 SYRINGE (ML) INTRAVENOUS AS NEEDED
OUTPATIENT
Start: 2023-10-06

## 2023-10-06 RX ORDER — ALBUTEROL SULFATE 0.83 MG/ML
3 SOLUTION RESPIRATORY (INHALATION) AS NEEDED
Status: CANCELLED | OUTPATIENT
Start: 2023-10-06

## 2023-10-06 RX ORDER — HEPARIN 100 UNIT/ML
500 SYRINGE INTRAVENOUS AS NEEDED
OUTPATIENT
Start: 2023-10-06

## 2023-10-06 RX ORDER — DIPHENHYDRAMINE HYDROCHLORIDE 50 MG/ML
50 INJECTION INTRAMUSCULAR; INTRAVENOUS AS NEEDED
Status: DISCONTINUED | OUTPATIENT
Start: 2023-10-06 | End: 2023-10-06 | Stop reason: HOSPADM

## 2023-10-06 RX ADMIN — FERUMOXYTOL 510 MG: 510 INJECTION INTRAVENOUS at 09:04

## 2023-10-06 ASSESSMENT — PAIN SCALES - GENERAL: PAINLEVEL: 8

## 2023-10-09 ENCOUNTER — APPOINTMENT (OUTPATIENT)
Dept: RADIOLOGY | Facility: HOSPITAL | Age: 43
End: 2023-10-09
Payer: COMMERCIAL

## 2023-10-12 ENCOUNTER — APPOINTMENT (OUTPATIENT)
Dept: PAIN MEDICINE | Facility: CLINIC | Age: 43
End: 2023-10-12
Payer: MEDICARE

## 2023-10-12 DIAGNOSIS — E78.5 MILD HYPERLIPIDEMIA: ICD-10-CM

## 2023-10-12 RX ORDER — ATORVASTATIN CALCIUM 20 MG/1
20 TABLET, FILM COATED ORAL NIGHTLY
Qty: 90 TABLET | Refills: 1 | Status: SHIPPED | OUTPATIENT
Start: 2023-10-12

## 2023-10-26 ENCOUNTER — OFFICE VISIT (OUTPATIENT)
Dept: GASTROENTEROLOGY | Facility: CLINIC | Age: 43
End: 2023-10-26
Payer: COMMERCIAL

## 2023-10-26 VITALS — HEIGHT: 63 IN | BODY MASS INDEX: 39.55 KG/M2 | WEIGHT: 223.2 LBS

## 2023-10-26 DIAGNOSIS — D50.9 IRON DEFICIENCY ANEMIA, UNSPECIFIED IRON DEFICIENCY ANEMIA TYPE: ICD-10-CM

## 2023-10-26 DIAGNOSIS — K52.9 CHRONIC DIARRHEA: ICD-10-CM

## 2023-10-26 DIAGNOSIS — K21.9 CHRONIC GERD: Primary | ICD-10-CM

## 2023-10-26 PROCEDURE — 99214 OFFICE O/P EST MOD 30 MIN: CPT | Performed by: INTERNAL MEDICINE

## 2023-10-26 PROCEDURE — 3008F BODY MASS INDEX DOCD: CPT | Performed by: INTERNAL MEDICINE

## 2023-10-26 ASSESSMENT — ENCOUNTER SYMPTOMS
NEUROLOGICAL NEGATIVE: 1
ALLERGIC/IMMUNOLOGIC NEGATIVE: 1
HEMATOLOGIC/LYMPHATIC NEGATIVE: 1
ARTHRALGIAS: 1
JOINT SWELLING: 1
DIARRHEA: 1
RESPIRATORY NEGATIVE: 1
CARDIOVASCULAR NEGATIVE: 1
ENDOCRINE NEGATIVE: 1
PSYCHIATRIC NEGATIVE: 1
BLOOD IN STOOL: 1
CONSTITUTIONAL NEGATIVE: 1

## 2023-10-26 NOTE — PROGRESS NOTES
Subjective   Patient ID: Ayde Ramírez is a 43 y.o. female who presents for Rectal Bleeding (X 1 year on and off.), Diarrhea, and Bloated.  HPI Ayde is referred for rectal bleeding chronic diarrhea and bloating.  She is now having increasing reflux.  Rectal bleeding is bright red mixed in with her stool family history of colorectal cancer maternal grandmother last colonoscopy 11 years ago.  Denies any weight loss.  Increasing epigastric abdominal pain with reflux denies any dysphagia or hematemesis.    Review of Systems   Constitutional: Negative.    HENT: Negative.     Respiratory: Negative.     Cardiovascular: Negative.    Gastrointestinal:  Positive for blood in stool and diarrhea.   Endocrine: Negative.    Genitourinary: Negative.    Musculoskeletal:  Positive for arthralgias and joint swelling.   Allergic/Immunologic: Negative.    Neurological: Negative.    Hematological: Negative.    Psychiatric/Behavioral: Negative.         Objective   Physical Exam  Vitals and nursing note reviewed.   Constitutional:       Appearance: Normal appearance.   HENT:      Head: Normocephalic and atraumatic.      Mouth/Throat:      Mouth: Mucous membranes are moist.      Pharynx: Oropharynx is clear.   Eyes:      Conjunctiva/sclera: Conjunctivae normal.      Pupils: Pupils are equal, round, and reactive to light.   Cardiovascular:      Rate and Rhythm: Normal rate and regular rhythm.   Pulmonary:      Effort: Pulmonary effort is normal.      Breath sounds: Normal breath sounds.   Abdominal:      General: Abdomen is flat. Bowel sounds are normal.      Palpations: Abdomen is soft.   Musculoskeletal:      Cervical back: Normal range of motion and neck supple.   Skin:     General: Skin is warm and dry.   Neurological:      Mental Status: She is alert and oriented to person, place, and time.   Psychiatric:         Behavior: Behavior normal.         Assessment/Plan   Problem List Items Addressed This Visit             ICD-10-CM     Chronic GERD - Primary K21.9    Chronic diarrhea K52.9    Iron deficiency anemia D50.9        Chronic diarrhea GERD and iron deficiency anemia with rectal bleeding recommend EGD and colonoscopy

## 2023-11-03 ENCOUNTER — APPOINTMENT (OUTPATIENT)
Dept: HEMATOLOGY/ONCOLOGY | Facility: CLINIC | Age: 43
End: 2023-11-03
Payer: MEDICARE

## 2023-11-18 DIAGNOSIS — I10 ESSENTIAL HYPERTENSION: Primary | ICD-10-CM

## 2023-11-22 ENCOUNTER — TELEPHONE (OUTPATIENT)
Dept: PRIMARY CARE | Facility: CLINIC | Age: 43
End: 2023-11-22
Payer: COMMERCIAL

## 2023-11-22 DIAGNOSIS — R11.0 NAUSEA: Primary | ICD-10-CM

## 2023-11-22 RX ORDER — ONDANSETRON 8 MG/1
8 TABLET, ORALLY DISINTEGRATING ORAL EVERY 8 HOURS PRN
Qty: 20 TABLET | Refills: 0 | Status: SHIPPED | OUTPATIENT
Start: 2023-11-22 | End: 2023-11-29

## 2023-11-22 RX ORDER — AMLODIPINE BESYLATE 5 MG/1
5 TABLET ORAL DAILY
Qty: 90 TABLET | Refills: 0 | Status: SHIPPED | OUTPATIENT
Start: 2023-11-22 | End: 2024-02-23

## 2023-11-22 NOTE — TELEPHONE ENCOUNTER
She called in asking if she could get a refill on her zofran but she is asking for the ODT. Seems to work better.

## 2023-11-30 DIAGNOSIS — E11.9 TYPE 2 DIABETES MELLITUS WITHOUT COMPLICATION, UNSPECIFIED WHETHER LONG TERM INSULIN USE (MULTI): ICD-10-CM

## 2023-11-30 RX ORDER — DULAGLUTIDE 3 MG/.5ML
INJECTION, SOLUTION SUBCUTANEOUS
Qty: 8 ML | Refills: 1 | Status: SHIPPED | OUTPATIENT
Start: 2023-11-30

## 2023-12-05 ENCOUNTER — APPOINTMENT (OUTPATIENT)
Dept: HEMATOLOGY/ONCOLOGY | Facility: CLINIC | Age: 43
End: 2023-12-05
Payer: COMMERCIAL

## 2023-12-14 ENCOUNTER — APPOINTMENT (OUTPATIENT)
Dept: PRIMARY CARE | Facility: CLINIC | Age: 43
End: 2023-12-14
Payer: COMMERCIAL

## 2024-01-04 ENCOUNTER — HOSPITAL ENCOUNTER (OUTPATIENT)
Dept: RADIOLOGY | Facility: HOSPITAL | Age: 44
Discharge: HOME | End: 2024-01-04
Payer: COMMERCIAL

## 2024-01-04 DIAGNOSIS — M79.7 FIBROMYALGIA: ICD-10-CM

## 2024-01-04 DIAGNOSIS — M47.817 SPONDYLOSIS WITHOUT MYELOPATHY OR RADICULOPATHY, LUMBOSACRAL REGION: ICD-10-CM

## 2024-01-04 DIAGNOSIS — M54.17 RADICULOPATHY, LUMBOSACRAL REGION: ICD-10-CM

## 2024-01-04 PROCEDURE — 72148 MRI LUMBAR SPINE W/O DYE: CPT | Performed by: STUDENT IN AN ORGANIZED HEALTH CARE EDUCATION/TRAINING PROGRAM

## 2024-01-04 PROCEDURE — 72148 MRI LUMBAR SPINE W/O DYE: CPT

## 2024-01-08 DIAGNOSIS — M79.7 FIBROMYALGIA: Primary | ICD-10-CM

## 2024-01-08 RX ORDER — DULOXETIN HYDROCHLORIDE 60 MG/1
60 CAPSULE, DELAYED RELEASE ORAL 2 TIMES DAILY
Qty: 60 CAPSULE | Refills: 3 | Status: SHIPPED | OUTPATIENT
Start: 2024-01-08 | End: 2024-06-06

## 2024-01-09 DIAGNOSIS — R11.0 NAUSEA: Primary | ICD-10-CM

## 2024-01-09 RX ORDER — ONDANSETRON HYDROCHLORIDE 8 MG/1
8 TABLET, FILM COATED ORAL 3 TIMES DAILY PRN
Qty: 20 TABLET | Refills: 0 | Status: SHIPPED | OUTPATIENT
Start: 2024-01-09

## 2024-01-10 DIAGNOSIS — M32.9 SYSTEMIC LUPUS ERYTHEMATOSUS, UNSPECIFIED SLE TYPE, UNSPECIFIED ORGAN INVOLVEMENT STATUS (MULTI): Primary | ICD-10-CM

## 2024-01-10 RX ORDER — ALBUTEROL SULFATE 0.83 MG/ML
3 SOLUTION RESPIRATORY (INHALATION) AS NEEDED
OUTPATIENT
Start: 2024-01-12

## 2024-01-10 RX ORDER — FAMOTIDINE 10 MG/ML
20 INJECTION INTRAVENOUS ONCE AS NEEDED
OUTPATIENT
Start: 2024-01-12

## 2024-01-10 RX ORDER — DIPHENHYDRAMINE HYDROCHLORIDE 50 MG/ML
50 INJECTION INTRAMUSCULAR; INTRAVENOUS AS NEEDED
OUTPATIENT
Start: 2024-01-12

## 2024-01-10 RX ORDER — EPINEPHRINE 0.3 MG/.3ML
0.3 INJECTION SUBCUTANEOUS EVERY 5 MIN PRN
OUTPATIENT
Start: 2024-01-12

## 2024-01-11 ENCOUNTER — LAB (OUTPATIENT)
Dept: LAB | Facility: LAB | Age: 44
End: 2024-01-11
Payer: COMMERCIAL

## 2024-01-11 DIAGNOSIS — E61.1 IRON DEFICIENCY: ICD-10-CM

## 2024-01-11 DIAGNOSIS — D72.829 ELEVATED WHITE BLOOD CELL COUNT, UNSPECIFIED: ICD-10-CM

## 2024-01-11 DIAGNOSIS — M32.9 SYSTEMIC LUPUS ERYTHEMATOSUS, UNSPECIFIED SLE TYPE, UNSPECIFIED ORGAN INVOLVEMENT STATUS (MULTI): ICD-10-CM

## 2024-01-11 LAB
ALBUMIN SERPL BCP-MCNC: 4.4 G/DL (ref 3.4–5)
ALBUMIN SERPL BCP-MCNC: 4.5 G/DL (ref 3.4–5)
ALP SERPL-CCNC: 86 U/L (ref 33–110)
ALP SERPL-CCNC: 88 U/L (ref 33–110)
ALT SERPL W P-5'-P-CCNC: 25 U/L (ref 7–45)
ALT SERPL W P-5'-P-CCNC: 29 U/L (ref 7–45)
ANION GAP SERPL CALC-SCNC: 16 MMOL/L (ref 10–20)
ANION GAP SERPL CALC-SCNC: 16 MMOL/L (ref 10–20)
AST SERPL W P-5'-P-CCNC: 15 U/L (ref 9–39)
AST SERPL W P-5'-P-CCNC: 18 U/L (ref 9–39)
BASOPHILS # BLD AUTO: 0.04 X10*3/UL (ref 0–0.1)
BASOPHILS # BLD AUTO: 0.04 X10*3/UL (ref 0–0.1)
BASOPHILS NFR BLD AUTO: 0.3 %
BASOPHILS NFR BLD AUTO: 0.3 %
BILIRUB SERPL-MCNC: 0.4 MG/DL (ref 0–1.2)
BILIRUB SERPL-MCNC: 0.4 MG/DL (ref 0–1.2)
BUN SERPL-MCNC: 10 MG/DL (ref 6–23)
BUN SERPL-MCNC: 9 MG/DL (ref 6–23)
CALCIUM SERPL-MCNC: 10 MG/DL (ref 8.6–10.3)
CALCIUM SERPL-MCNC: 10 MG/DL (ref 8.6–10.3)
CHLORIDE SERPL-SCNC: 103 MMOL/L (ref 98–107)
CHLORIDE SERPL-SCNC: 103 MMOL/L (ref 98–107)
CO2 SERPL-SCNC: 23 MMOL/L (ref 21–32)
CO2 SERPL-SCNC: 24 MMOL/L (ref 21–32)
CREAT SERPL-MCNC: 0.67 MG/DL (ref 0.5–1.05)
CREAT SERPL-MCNC: 0.7 MG/DL (ref 0.5–1.05)
CRP SERPL-MCNC: 1.06 MG/DL
EGFRCR SERPLBLD CKD-EPI 2021: >90 ML/MIN/1.73M*2
EGFRCR SERPLBLD CKD-EPI 2021: >90 ML/MIN/1.73M*2
EOSINOPHIL # BLD AUTO: 0.21 X10*3/UL (ref 0–0.7)
EOSINOPHIL # BLD AUTO: 0.24 X10*3/UL (ref 0–0.7)
EOSINOPHIL NFR BLD AUTO: 1.5 %
EOSINOPHIL NFR BLD AUTO: 1.7 %
ERYTHROCYTE [DISTWIDTH] IN BLOOD BY AUTOMATED COUNT: 14.3 % (ref 11.5–14.5)
ERYTHROCYTE [DISTWIDTH] IN BLOOD BY AUTOMATED COUNT: 14.3 % (ref 11.5–14.5)
FERRITIN SERPL-MCNC: 297 NG/ML (ref 8–150)
FOLATE SERPL-MCNC: 3.1 NG/ML
GLUCOSE SERPL-MCNC: 131 MG/DL (ref 74–99)
GLUCOSE SERPL-MCNC: 134 MG/DL (ref 74–99)
HCT VFR BLD AUTO: 47.9 % (ref 36–46)
HCT VFR BLD AUTO: 48.4 % (ref 36–46)
HGB BLD-MCNC: 15.3 G/DL (ref 12–16)
HGB BLD-MCNC: 15.5 G/DL (ref 12–16)
IMM GRANULOCYTES # BLD AUTO: 0.05 X10*3/UL (ref 0–0.7)
IMM GRANULOCYTES # BLD AUTO: 0.06 X10*3/UL (ref 0–0.7)
IMM GRANULOCYTES NFR BLD AUTO: 0.3 % (ref 0–0.9)
IMM GRANULOCYTES NFR BLD AUTO: 0.4 % (ref 0–0.9)
IRON SATN MFR SERPL: 21 % (ref 25–45)
IRON SERPL-MCNC: 76 UG/DL (ref 35–150)
LYMPHOCYTES # BLD AUTO: 3.25 X10*3/UL (ref 1.2–4.8)
LYMPHOCYTES # BLD AUTO: 3.4 X10*3/UL (ref 1.2–4.8)
LYMPHOCYTES NFR BLD AUTO: 23.1 %
LYMPHOCYTES NFR BLD AUTO: 23.8 %
MCH RBC QN AUTO: 28.8 PG (ref 26–34)
MCH RBC QN AUTO: 28.9 PG (ref 26–34)
MCHC RBC AUTO-ENTMCNC: 31.9 G/DL (ref 32–36)
MCHC RBC AUTO-ENTMCNC: 32 G/DL (ref 32–36)
MCV RBC AUTO: 90 FL (ref 80–100)
MCV RBC AUTO: 91 FL (ref 80–100)
MONOCYTES # BLD AUTO: 0.65 X10*3/UL (ref 0.1–1)
MONOCYTES # BLD AUTO: 0.67 X10*3/UL (ref 0.1–1)
MONOCYTES NFR BLD AUTO: 4.6 %
MONOCYTES NFR BLD AUTO: 4.7 %
NEUTROPHILS # BLD AUTO: 9.85 X10*3/UL (ref 1.2–7.7)
NEUTROPHILS # BLD AUTO: 9.93 X10*3/UL (ref 1.2–7.7)
NEUTROPHILS NFR BLD AUTO: 69.4 %
NEUTROPHILS NFR BLD AUTO: 69.9 %
NRBC BLD-RTO: 0 /100 WBCS (ref 0–0)
NRBC BLD-RTO: 0 /100 WBCS (ref 0–0)
PLATELET # BLD AUTO: 490 X10*3/UL (ref 150–450)
PLATELET # BLD AUTO: 496 X10*3/UL (ref 150–450)
POTASSIUM SERPL-SCNC: 3.8 MMOL/L (ref 3.5–5.3)
POTASSIUM SERPL-SCNC: 4.3 MMOL/L (ref 3.5–5.3)
PROT SERPL-MCNC: 6.7 G/DL (ref 6.4–8.2)
PROT SERPL-MCNC: 7 G/DL (ref 6.4–8.2)
RBC # BLD AUTO: 5.29 X10*6/UL (ref 4–5.2)
RBC # BLD AUTO: 5.39 X10*6/UL (ref 4–5.2)
SODIUM SERPL-SCNC: 138 MMOL/L (ref 136–145)
SODIUM SERPL-SCNC: 139 MMOL/L (ref 136–145)
TIBC SERPL-MCNC: 364 UG/DL (ref 240–445)
UIBC SERPL-MCNC: 288 UG/DL (ref 110–370)
VIT B12 SERPL-MCNC: 374 PG/ML (ref 211–911)
WBC # BLD AUTO: 14.1 X10*3/UL (ref 4.4–11.3)
WBC # BLD AUTO: 14.3 X10*3/UL (ref 4.4–11.3)

## 2024-01-11 PROCEDURE — 82728 ASSAY OF FERRITIN: CPT

## 2024-01-11 PROCEDURE — 80053 COMPREHEN METABOLIC PANEL: CPT

## 2024-01-11 PROCEDURE — 83550 IRON BINDING TEST: CPT

## 2024-01-11 PROCEDURE — 36415 COLL VENOUS BLD VENIPUNCTURE: CPT

## 2024-01-11 PROCEDURE — 85025 COMPLETE CBC W/AUTO DIFF WBC: CPT

## 2024-01-11 PROCEDURE — 86140 C-REACTIVE PROTEIN: CPT

## 2024-01-11 PROCEDURE — 83540 ASSAY OF IRON: CPT

## 2024-01-11 PROCEDURE — 82607 VITAMIN B-12: CPT

## 2024-01-11 PROCEDURE — 82746 ASSAY OF FOLIC ACID SERUM: CPT

## 2024-01-19 ENCOUNTER — HOSPITAL ENCOUNTER (OUTPATIENT)
Dept: GASTROENTEROLOGY | Facility: CLINIC | Age: 44
Setting detail: OUTPATIENT SURGERY
Discharge: HOME | End: 2024-01-19
Payer: COMMERCIAL

## 2024-01-19 VITALS
HEART RATE: 84 BPM | HEIGHT: 63 IN | SYSTOLIC BLOOD PRESSURE: 90 MMHG | RESPIRATION RATE: 16 BRPM | OXYGEN SATURATION: 95 % | BODY MASS INDEX: 40.12 KG/M2 | WEIGHT: 226.41 LBS | DIASTOLIC BLOOD PRESSURE: 59 MMHG | TEMPERATURE: 98 F

## 2024-01-19 DIAGNOSIS — K52.9 CHRONIC DIARRHEA: Primary | ICD-10-CM

## 2024-01-19 DIAGNOSIS — D50.9 IRON DEFICIENCY ANEMIA, UNSPECIFIED IRON DEFICIENCY ANEMIA TYPE: ICD-10-CM

## 2024-01-19 LAB — GLUCOSE BLD MANUAL STRIP-MCNC: 139 MG/DL (ref 74–99)

## 2024-01-19 PROCEDURE — 2500000005 HC RX 250 GENERAL PHARMACY W/O HCPCS: Performed by: INTERNAL MEDICINE

## 2024-01-19 PROCEDURE — 88305 TISSUE EXAM BY PATHOLOGIST: CPT | Mod: TC,SUR,SAMLAB | Performed by: INTERNAL MEDICINE

## 2024-01-19 PROCEDURE — 82947 ASSAY GLUCOSE BLOOD QUANT: CPT

## 2024-01-19 PROCEDURE — 2500000004 HC RX 250 GENERAL PHARMACY W/ HCPCS (ALT 636 FOR OP/ED): Performed by: INTERNAL MEDICINE

## 2024-01-19 PROCEDURE — 7100000010 HC PHASE TWO TIME - EACH INCREMENTAL 1 MINUTE

## 2024-01-19 PROCEDURE — 88305 TISSUE EXAM BY PATHOLOGIST: CPT | Performed by: PATHOLOGY

## 2024-01-19 PROCEDURE — 45378 DIAGNOSTIC COLONOSCOPY: CPT | Performed by: INTERNAL MEDICINE

## 2024-01-19 PROCEDURE — 43239 EGD BIOPSY SINGLE/MULTIPLE: CPT | Performed by: INTERNAL MEDICINE

## 2024-01-19 PROCEDURE — 3700000012 HC SEDATION LEVEL 5+ TIME - INITIAL 15 MINUTES 5/> YEARS

## 2024-01-19 PROCEDURE — 3700000013 HC SEDATION LEVEL 5+ TIME - EACH ADDITIONAL 15 MINUTES

## 2024-01-19 PROCEDURE — 7100000009 HC PHASE TWO TIME - INITIAL BASE CHARGE

## 2024-01-19 PROCEDURE — 99152 MOD SED SAME PHYS/QHP 5/>YRS: CPT | Performed by: INTERNAL MEDICINE

## 2024-01-19 PROCEDURE — 99153 MOD SED SAME PHYS/QHP EA: CPT | Performed by: INTERNAL MEDICINE

## 2024-01-19 RX ORDER — DIPHENHYDRAMINE HYDROCHLORIDE 50 MG/ML
INJECTION INTRAMUSCULAR; INTRAVENOUS AS NEEDED
Status: COMPLETED | OUTPATIENT
Start: 2024-01-19 | End: 2024-01-19

## 2024-01-19 RX ORDER — MEPERIDINE HYDROCHLORIDE 25 MG/ML
INJECTION INTRAMUSCULAR; INTRAVENOUS; SUBCUTANEOUS AS NEEDED
Status: COMPLETED | OUTPATIENT
Start: 2024-01-19 | End: 2024-01-19

## 2024-01-19 RX ORDER — MIDAZOLAM HYDROCHLORIDE 1 MG/ML
INJECTION, SOLUTION INTRAMUSCULAR; INTRAVENOUS AS NEEDED
Status: COMPLETED | OUTPATIENT
Start: 2024-01-19 | End: 2024-01-19

## 2024-01-19 RX ORDER — SODIUM CHLORIDE, SODIUM LACTATE, POTASSIUM CHLORIDE, CALCIUM CHLORIDE 600; 310; 30; 20 MG/100ML; MG/100ML; MG/100ML; MG/100ML
20 INJECTION, SOLUTION INTRAVENOUS CONTINUOUS
Status: DISCONTINUED | OUTPATIENT
Start: 2024-01-19 | End: 2024-01-20 | Stop reason: HOSPADM

## 2024-01-19 RX ADMIN — DIPHENHYDRAMINE HYDROCHLORIDE 25 MG: 50 INJECTION INTRAMUSCULAR; INTRAVENOUS at 10:20

## 2024-01-19 RX ADMIN — DIPHENHYDRAMINE HYDROCHLORIDE 25 MG: 50 INJECTION INTRAMUSCULAR; INTRAVENOUS at 10:03

## 2024-01-19 RX ADMIN — MEPERIDINE HYDROCHLORIDE 25 MG: 25 INJECTION INTRAMUSCULAR; INTRAVENOUS; SUBCUTANEOUS at 09:57

## 2024-01-19 RX ADMIN — MIDAZOLAM 2.5 MG: 1 INJECTION INTRAMUSCULAR; INTRAVENOUS at 09:57

## 2024-01-19 RX ADMIN — GLUCAGON HYDROCHLORIDE 1 MG: KIT at 10:14

## 2024-01-19 RX ADMIN — MIDAZOLAM 2.5 MG: 1 INJECTION INTRAMUSCULAR; INTRAVENOUS at 10:00

## 2024-01-19 RX ADMIN — SODIUM CHLORIDE, POTASSIUM CHLORIDE, SODIUM LACTATE AND CALCIUM CHLORIDE 20 ML/HR: 600; 310; 30; 20 INJECTION, SOLUTION INTRAVENOUS at 09:38

## 2024-01-19 RX ADMIN — MEPERIDINE HYDROCHLORIDE 25 MG: 25 INJECTION INTRAMUSCULAR; INTRAVENOUS; SUBCUTANEOUS at 10:00

## 2024-01-19 RX ADMIN — MIDAZOLAM 2.5 MG: 1 INJECTION INTRAMUSCULAR; INTRAVENOUS at 10:07

## 2024-01-19 RX ADMIN — BENZOCAINE, BUTAMBEN, AND TETRACAINE HYDROCHLORIDE 2 SPRAY: .028; .004; .004 AEROSOL, SPRAY TOPICAL at 09:56

## 2024-01-19 ASSESSMENT — PAIN SCALES - GENERAL
PAINLEVEL_OUTOF10: 0 - NO PAIN
PAINLEVEL_OUTOF10: 1
PAINLEVEL_OUTOF10: 0 - NO PAIN

## 2024-01-19 ASSESSMENT — PAIN - FUNCTIONAL ASSESSMENT
PAIN_FUNCTIONAL_ASSESSMENT: 0-10

## 2024-01-19 ASSESSMENT — COLUMBIA-SUICIDE SEVERITY RATING SCALE - C-SSRS
6. HAVE YOU EVER DONE ANYTHING, STARTED TO DO ANYTHING, OR PREPARED TO DO ANYTHING TO END YOUR LIFE?: NO
1. IN THE PAST MONTH, HAVE YOU WISHED YOU WERE DEAD OR WISHED YOU COULD GO TO SLEEP AND NOT WAKE UP?: NO
2. HAVE YOU ACTUALLY HAD ANY THOUGHTS OF KILLING YOURSELF?: NO

## 2024-01-19 NOTE — H&P
History Of Present Illness  Ayde Ramírez is a 43 y.o. female presenting with iron deficiency anemia presents for EGD and colonoscopy.     Past Medical History  Past Medical History:   Diagnosis Date    Chronic pancreatitis (CMS/HCC) 2023    Chronic sinusitis 2023    Diabetes mellitus (CMS/HCC)     Disease of thyroid gland     Hyperlipidemia     Hypertension     Lupus (CMS/HCC)     Other hypersomnia 2021    Excessive daytime sleepiness    Personal history of other diseases of the female genital tract 2019    History of amenorrhea    Personal history of urinary calculi 2018    History of renal calculi    Secondary pancreatic insufficiency 2023     Surgical History  Past Surgical History:   Procedure Laterality Date     SECTION, CLASSIC  2019     Section    OTHER SURGICAL HISTORY  2019    Cholecystotomy    OTHER SURGICAL HISTORY  2022    Medial branch block    OTHER SURGICAL HISTORY  2021    Intra-articular corticosteroid injection    OTHER SURGICAL HISTORY  2022    Epidural steroid injection    OTHER SURGICAL HISTORY  2021    Esophagogastroduodenoscopy    OTHER SURGICAL HISTORY  2019    Lithotripsy     Social History  She reports that she has been smoking cigarettes. She has a 10.00 pack-year smoking history. She has never used smokeless tobacco. She reports that she does not currently use alcohol. She reports that she does not use drugs.    Family History  No family history on file.     Allergies  Allergies   Allergen Reactions    Adhesive Tape-Silicones Rash     Review of Systems  Pre-sedation Evaluation:  ASA Classification - ASA 2 - Patient with mild systemic disease with no functional limitations  Mallampati Score - II (hard and soft palate, upper portion of tonsils anduvula visible)    Physical Exam  Vitals and nursing note reviewed.   Constitutional:       Appearance: Normal appearance.   HENT:      Head:  "Normocephalic.      Mouth/Throat:      Mouth: Mucous membranes are moist.      Pharynx: Oropharynx is clear.   Eyes:      Conjunctiva/sclera: Conjunctivae normal.      Pupils: Pupils are equal, round, and reactive to light.   Cardiovascular:      Pulses: Normal pulses.      Heart sounds: Normal heart sounds.   Pulmonary:      Effort: Pulmonary effort is normal.      Breath sounds: Normal breath sounds.   Abdominal:      General: Abdomen is flat. Bowel sounds are normal.      Palpations: Abdomen is soft.   Musculoskeletal:      Cervical back: Normal range of motion and neck supple.   Skin:     General: Skin is warm and dry.   Neurological:      General: No focal deficit present.      Mental Status: She is alert and oriented to person, place, and time.   Psychiatric:         Behavior: Behavior normal.          Last Recorded Vitals  Blood pressure 118/80, pulse 90, temperature 36.7 °C (98 °F), temperature source Temporal, resp. rate 16, height 1.6 m (5' 3\"), weight 103 kg (226 lb 6.6 oz), last menstrual period 02/24/2023, SpO2 98 %.     Assessment/Plan   Problem List Items Addressed This Visit       Chronic diarrhea - Primary    Relevant Orders    Colonoscopy Diagnostic    Iron deficiency anemia    Relevant Orders    Colonoscopy Diagnostic    EGD          PTA/Current Medications:  (Not in a hospital admission)    Current Outpatient Medications   Medication Sig Dispense Refill    amLODIPine (Norvasc) 5 mg tablet Take 1 tablet by mouth once daily 90 tablet 0    atenolol (Tenormin) 50 mg tablet Take 3 tablets (150 mg) by mouth once daily.      atorvastatin (Lipitor) 20 mg tablet Take 1 tablet (20 mg) by mouth once daily at bedtime. 90 tablet 1    cholecalciferol (Vitamin D-3) 25 MCG (1000 UT) tablet Take 1 tablet (25 mcg) by mouth once daily. 90 tablet 0    clindamycin (Cleocin T) 1 % lotion Apply topically twice a day.      cyanocobalamin (Vitamin B-12) 1,000 mcg tablet Take 1 tablet (1,000 mcg) by mouth once daily. 90 " tablet 1    cyclobenzaprine (Flexeril) 10 mg tablet Take by mouth.      dapagliflozin (Farxiga) 5 mg Take 1 tablet (5 mg) by mouth once daily.      diclofenac sodium 1 % kit Apply topically 4 times a day as needed.      dulaglutide (Trulicity) 3 mg/0.5 mL pen injector Inject 1 pan once a week 8 mL 1    DULoxetine (Cymbalta) 60 mg DR capsule Take 1 capsule (60 mg) by mouth 2 times a day. 60 capsule 3    folic acid (Folvite) 1 mg tablet Take 1 tablet (1 mg) by mouth once daily.      levothyroxine (Synthroid, Levoxyl) 25 mcg tablet Take 1 tablet (25 mcg) by mouth once daily. 90 tablet 1    lipase-protease-amylase (Zenpep) 40,000-126,000- 168,000 unit capsule,delayed release(DR/EC) Take by mouth.      magnesium chloride (MagDelay) 64 mg EC tablet Take by mouth.      metFORMIN, MOD, (Glumetza) 500 mg 24 hr tablet Take 2 tablets (1,000 mg) by mouth 2 times a day with meals.      mycophenolate (Cellcept) 500 mg tablet Take by mouth.      norethindrone (Jencycla) 0.35 mg tablet Take 1 tablet (0.35 mg) by mouth once daily.      omeprazole (PriLOSEC) 20 mg DR capsule Take 1 capsule (20 mg) by mouth once daily. 90 capsule 1    ondansetron (Zofran) 8 mg tablet Take 1 tablet (8 mg) by mouth 3 times a day as needed for nausea or vomiting. 20 tablet 0    predniSONE (Deltasone) 10 mg tablet Take 1 tablet (10 mg) by mouth once daily.      spironolactone (Aldactone) 25 mg tablet Take 1 tablet (25 mg) by mouth once daily. 90 tablet 1    anifrolumab-fnia (Saphnelo) 300MG IV EVERY 4 WEEKS (Patient not taking: Reported on 10/6/2023) 2 mL 9    blood sugar diagnostic (Blood Glucose Test) strip TEST FOUR TIMES A DAY.      diazePAM (Valium) 10 mg tablet Take by mouth.      meclizine (Antivert) 12.5 mg tablet Take by mouth.      metroNIDAZOLE (Metrogel) 0.75 % gel Apply topically.      simethicone (Gas Relief, simethicone,) 80 mg chewable tablet Chew 1 tablet (80 mg) every 6 hours if needed for flatulence. (Patient not taking: Reported on  1/19/2024) 30 tablet 1    topiramate (Topamax) 100 mg tablet Take by mouth twice a day.       Current Facility-Administered Medications   Medication Dose Route Frequency Provider Last Rate Last Admin    lactated Ringer's infusion  20 mL/hr intravenous Continuous Farhat Joya, DO Farhat Joya, DO

## 2024-01-19 NOTE — DISCHARGE INSTRUCTIONS
Patient Instructions after a Colonoscopy      The anesthetics, sedatives or narcotics which were given to you today will be acting in your body for the next 24 hours, so you might feel a little sleepy or groggy.  This feeling should slowly wear off. Carefully read and follow the instructions.     You received sedation today:  - Do not drive or operate any machinery or power tools of any kind.   - No alcoholic beverages today, not even beer or wine.  - Do not make any important decisions or sign any legal documents.  - No over the counter medications that contain alcohol or that may cause drowsiness.  - Do not make any important decisions or sign any legal documents.    While it is common to experience mild to moderate abdominal distention, gas, or belching after your procedure, if any of these symptoms occur following discharge from the GI Lab or within one week of having your procedure, call the [facility] to be advised whether a visit to your nearest Urgent Care or Emergency Department is indicated.  Take this paper with you if you go.     - If you develop an allergic reaction to the medications that were given during your procedure such as difficulty breathing, rash, hives, severe nausea, vomiting or lightheadedness.  - If you experience chest pain, shortness of breath, severe abdominal pain, fevers and chills.  -If you develop signs and symptoms of bleeding such as blood in your spit, if your stools turn black, tarry, or bloody  - If you have not urinated within 8 hours following your procedure.  - If your IV site becomes painful, red, inflamed, or looks infected.    If you received a biopsy/polypectomy/sphincterotomy the following instructions apply below:    __ Do not use Aspirin containing products, non-steroidal medications or anti-coagulants for one week following your procedure. (Examples of these types of medications are: Advil, Arthrotec, Aleve, Coumadin, Ecotrin, Heparin, Ibuprofen,  Indocin, Motrin, Naprosyn, Nuprin, Plavix, Vioxx, and Voltarin, or their generic forms.  This list is not all-inclusive.  Check with your physician or pharmacist before resuming medications.)   __ Eat a soft diet today.  Avoid foods that are poorly digested for the next 24 hours.  These foods would include: nuts, beans, lettuce, red meats, and fried foods. Start with liquids and advance your diet as tolerated, gradually work up to eating solids.   __ Do not have a Barium Study or Enema for one week.    Your physician recommends the additional following instructions:    -You have a contact number available for emergencies. The signs and symptoms of potential delayed complications were discussed with you. You may return to normal activities tomorrow.  -Resume your previous diet.  -Continue your present medications.   -We are waiting for your pathology results.  -Your physician has recommended a repeat colonoscopy (date to be determined after pending pathology results are reviewed) for surveillance based on pathology results.  -The findings and recommendations have been discussed with you.  -The findings and recommendations were discussed with your family.  - Please see Medication Reconciliation Form for new medication/medications prescribed.       If you experience any problems or have any questions following discharge from the GI Lab, please call:        Nurse Signature                                                                        Date___________________                                                                            Patient/Responsible Party Signature                                        Date___________________Patient Instructions after an endoscopy or colonoscopy      The anesthetics, sedatives or narcotics which were given to you today will be acting in your body for the next 24 hours, so you might feel a little sleepy or groggy.  This feeling should slowly wear off. Carefully read and follow  the instructions.     You received sedation today:  - Do not drive or operate any machinery or power tools of any kind.   - No alcoholic beverages today, not even beer or wine.  - Do not make any important decisions or sign any legal documents.  - No over the counter medications that contain alcohol or that may cause drowsiness.  - Do not make any important decisions or sign any legal documents.    While it is common to experience mild to moderate abdominal distention, gas, or belching after your procedure, if any of these symptoms occur following discharge from the GI Lab or within one week of having your procedure, call the [facility] to be advised whether a visit to your nearest Urgent Care or Emergency Department is indicated.  Take this paper with you if you go.     - If you develop an allergic reaction to the medications that were given during your procedure such as difficulty breathing, rash, hives, severe nausea, vomiting or lightheadedness.- If you experience chest pain, shortness of breath, severe abdominal pain, fevers and chills.    -If you develop signs and symptoms of bleeding such as blood in your spit, if your stools turn black, tarry, or bloody    - If you have not urinated within 8 hours following your procedure.- If your IV site becomes painful, red, inflamed, or looks infected.

## 2024-01-24 LAB
LABORATORY COMMENT REPORT: NORMAL
PATH REPORT.FINAL DX SPEC: NORMAL
PATH REPORT.GROSS SPEC: NORMAL
PATH REPORT.TOTAL CANCER: NORMAL

## 2024-01-30 ENCOUNTER — TELEPHONE (OUTPATIENT)
Dept: GASTROENTEROLOGY | Facility: CLINIC | Age: 44
End: 2024-01-30
Payer: COMMERCIAL

## 2024-01-30 NOTE — TELEPHONE ENCOUNTER
PATIENT NOTIFIED AND VERBALIZED UNDERSTANDING Scheduled in April    ----- Message from Farhat Joya DO sent at 1/29/2024  7:36 AM EST -----  Essentially normal upper GI endoscopy aside from minor reflux changes.  Continue present medical therapy follow-up as before

## 2024-02-22 DIAGNOSIS — I10 ESSENTIAL HYPERTENSION: ICD-10-CM

## 2024-02-23 RX ORDER — AMLODIPINE BESYLATE 5 MG/1
5 TABLET ORAL DAILY
Qty: 30 TABLET | Refills: 0 | Status: SHIPPED | OUTPATIENT
Start: 2024-02-23 | End: 2024-03-25

## 2024-02-27 ENCOUNTER — TELEPHONE (OUTPATIENT)
Dept: PAIN MEDICINE | Facility: CLINIC | Age: 44
End: 2024-02-27

## 2024-02-27 ENCOUNTER — OFFICE VISIT (OUTPATIENT)
Dept: PAIN MEDICINE | Facility: CLINIC | Age: 44
End: 2024-02-27
Payer: COMMERCIAL

## 2024-02-27 VITALS
BODY MASS INDEX: 41.29 KG/M2 | HEART RATE: 94 BPM | DIASTOLIC BLOOD PRESSURE: 78 MMHG | HEIGHT: 63 IN | WEIGHT: 233 LBS | SYSTOLIC BLOOD PRESSURE: 153 MMHG

## 2024-02-27 DIAGNOSIS — M54.17 LUMBOSACRAL RADICULOPATHY: Primary | ICD-10-CM

## 2024-02-27 DIAGNOSIS — M47.27 OSTEOARTHRITIS OF SPINE WITH RADICULOPATHY, LUMBOSACRAL REGION: ICD-10-CM

## 2024-02-27 DIAGNOSIS — M79.7 FIBROMYALGIA: ICD-10-CM

## 2024-02-27 PROCEDURE — 99214 OFFICE O/P EST MOD 30 MIN: CPT | Performed by: PHYSICIAN ASSISTANT

## 2024-02-27 RX ORDER — PREGABALIN 50 MG/1
50 CAPSULE ORAL 2 TIMES DAILY
Qty: 60 CAPSULE | Refills: 0 | Status: SHIPPED | OUTPATIENT
Start: 2024-02-27 | End: 2024-04-01 | Stop reason: SINTOL

## 2024-02-27 ASSESSMENT — PAIN SCALES - GENERAL
PAINLEVEL: 10-WORST PAIN EVER
PAINLEVEL_OUTOF10: 10 - WORST POSSIBLE PAIN

## 2024-02-27 ASSESSMENT — ENCOUNTER SYMPTOMS
BACK PAIN: 1
CARDIOVASCULAR NEGATIVE: 1
RESPIRATORY NEGATIVE: 1
ALLERGIC/IMMUNOLOGIC NEGATIVE: 1
PSYCHIATRIC NEGATIVE: 1
MYALGIAS: 1
GASTROINTESTINAL NEGATIVE: 1
EYES NEGATIVE: 1
CONSTITUTIONAL NEGATIVE: 1
ENDOCRINE NEGATIVE: 1
ARTHRALGIAS: 1
HEMATOLOGIC/LYMPHATIC NEGATIVE: 1

## 2024-02-27 ASSESSMENT — COLUMBIA-SUICIDE SEVERITY RATING SCALE - C-SSRS
2. HAVE YOU ACTUALLY HAD ANY THOUGHTS OF KILLING YOURSELF?: NO
6. HAVE YOU EVER DONE ANYTHING, STARTED TO DO ANYTHING, OR PREPARED TO DO ANYTHING TO END YOUR LIFE?: NO
1. IN THE PAST MONTH, HAVE YOU WISHED YOU WERE DEAD OR WISHED YOU COULD GO TO SLEEP AND NOT WAKE UP?: NO

## 2024-02-27 ASSESSMENT — PATIENT HEALTH QUESTIONNAIRE - PHQ9
7. TROUBLE CONCENTRATING ON THINGS, SUCH AS READING THE NEWSPAPER OR WATCHING TELEVISION: NOT AT ALL
SUM OF ALL RESPONSES TO PHQ9 QUESTIONS 1 AND 2: 3
8. MOVING OR SPEAKING SO SLOWLY THAT OTHER PEOPLE COULD HAVE NOTICED. OR THE OPPOSITE, BEING SO FIGETY OR RESTLESS THAT YOU HAVE BEEN MOVING AROUND A LOT MORE THAN USUAL: NOT AT ALL
6. FEELING BAD ABOUT YOURSELF - OR THAT YOU ARE A FAILURE OR HAVE LET YOURSELF OR YOUR FAMILY DOWN: NOT AT ALL
3. TROUBLE FALLING OR STAYING ASLEEP OR SLEEPING TOO MUCH: NOT AT ALL
5. POOR APPETITE OR OVEREATING: NEARLY EVERY DAY
1. LITTLE INTEREST OR PLEASURE IN DOING THINGS: NEARLY EVERY DAY
SUM OF ALL RESPONSES TO PHQ QUESTIONS 1-9: 9
4. FEELING TIRED OR HAVING LITTLE ENERGY: NEARLY EVERY DAY
9. THOUGHTS THAT YOU WOULD BE BETTER OFF DEAD, OR OF HURTING YOURSELF: NOT AT ALL
2. FEELING DOWN, DEPRESSED OR HOPELESS: NOT AT ALL
10. IF YOU CHECKED OFF ANY PROBLEMS, HOW DIFFICULT HAVE THESE PROBLEMS MADE IT FOR YOU TO DO YOUR WORK, TAKE CARE OF THINGS AT HOME, OR GET ALONG WITH OTHER PEOPLE: VERY DIFFICULT

## 2024-02-27 ASSESSMENT — PAIN DESCRIPTION - DESCRIPTORS: DESCRIPTORS: ACHING;SHARP

## 2024-02-27 ASSESSMENT — PAIN - FUNCTIONAL ASSESSMENT: PAIN_FUNCTIONAL_ASSESSMENT: 0-10

## 2024-02-27 NOTE — H&P (VIEW-ONLY)
Subjective   Patient ID: Ayde Ramírez is a 44 y.o. female who presents for Back Pain (Patient here to follow up from lumbar MRI.  Patient rates her pain a 5/10 now and it does get up to a 10/10.  She states lying, sitting, or walking can all increase her pain.  Patient states her pain is across her lower back and radiates down her legs to her feet.  She states she gets numbness/tingling in her feet.).    Falls risk NA due to age.  Depression screen completed, score 9.  Patient provided with [facility] resources handout.  She denied any suicidal thoughts or ideations.  Patient is a current smoker.  MU score 62.  ORT score 2.    Ana Tirado RN 02/27/24 10:15 AM       Patient is a 44-year-old female. She has a past medical history significant for lumbosacral spondylosis, lumbosacral neuritis and fibromyalgia.  Unfortunately, most recent RFA to the lumbar spine did not give her the relief she was looking for.  She continues have lower back pain with bilateral leg pain.  This is down to her foot/ankle.  It is a numbness and tingling.  She rates it a 5-10/10.  The more she gets up and tries to do.  The worst it gets.  It is worse with ambulation.  Worse with activity.  This significantly affects her quality of life.  Significantly affects her activities during the day.  Patient was delayed in follow-up due to her mother-in-law's illness.  She states that her mother-in-law passed away.  I gave condolences.  She states that things have just been very bothersome.  She also feels that her fibromyalgia is flared up.  She states that things was overall very uncomfortable at this time.  She states that history of gabapentin made her tired.  She is using Topamax and Flector patches With some improvement but once again, not quite enough.         Review of Systems   Constitutional: Negative.    HENT: Negative.     Eyes: Negative.    Respiratory: Negative.     Cardiovascular: Negative.    Gastrointestinal:  Negative.    Endocrine: Negative.    Genitourinary: Negative.    Musculoskeletal:  Positive for arthralgias, back pain, gait problem and myalgias.   Skin: Negative.    Allergic/Immunologic: Negative.    Hematological: Negative.    Psychiatric/Behavioral: Negative.         Objective   Physical Exam  Vitals and nursing note reviewed.   Constitutional:       Appearance: Normal appearance. She is obese.   HENT:      Head: Normocephalic and atraumatic.      Right Ear: External ear normal.      Left Ear: External ear normal.      Nose: Nose normal.      Mouth/Throat:      Pharynx: Oropharynx is clear.   Eyes:      Pupils: Pupils are equal, round, and reactive to light.   Cardiovascular:      Rate and Rhythm: Normal rate and regular rhythm.      Pulses: Normal pulses.   Pulmonary:      Effort: Pulmonary effort is normal.   Musculoskeletal:         General: Normal range of motion.      Cervical back: Normal range of motion.      Comments: 5/5 lower extremity strength   Skin:     General: Skin is warm and dry.   Neurological:      General: No focal deficit present.      Mental Status: She is alert and oriented to person, place, and time. Mental status is at baseline.   Psychiatric:         Mood and Affect: Mood normal.         Behavior: Behavior normal.         Thought Content: Thought content normal.         Judgment: Judgment normal.         MR lumbar spine wo IV contrast  Status: Final result     PACS Images     Show images for MR lumbar spine wo IV contrast  Signed by    Signed Time Phone Pager   Eze Stevenson MD PhD 1/04/2024 15:55 [phone]     Exam Information    Status Exam Begun Exam Ended   Final 1/04/2024 15:05 1/04/2024 15:45     Study Result    Narrative & Impression   Interpreted By:  Eze Stevenson,   STUDY:  MR LUMBAR SPINE WO IV CONTRAST;  1/4/2024 3:45 pm      INDICATION:  Signs/Symptoms: LUMBOSACRAL RADICULOPATHY.      COMPARISON:  Lumbar spine MRI, 01/29/2021 and lumbar spine radiographs,  05/23/2020      ACCESSION NUMBER(S):  [id number]      ORDERING CLINICIAN:  SEDA THACKER      TECHNIQUE:  Sagittal T1, T2, STIR, axial T1 and T2 weighted images of the lumbar  spine were acquired.      FINDINGS:  5 lumbar-type vertebral bodies are again noted, the last well-formed  disc space is labeled L5-S1.      Alignment: The vertebral alignment is maintained.      Vertebrae/Intervertebral Discs: The vertebral bodies demonstrate  expected height. Mild degenerative endplate changes are again noted,  no associated marrow edema. Mild multilevel loss of normal disc T2  signal intensity and disc height, slightly progressed from 2021.      Conus: The lower thoracic cord appears unremarkable. The conus  terminates at L1-2. The cauda equina is unremarkable.      T12-L1:  No canal or foraminal narrowing.      L1-2:  No canal or foraminal narrowing.      L2-3:  No canal or foraminal narrowing.      L3-4: Facet hypertrophy. No canal or foraminal narrowing.      L4-5: Disc bulge, facet hypertrophy, prominent epidural fat, and  ligamentum flavum thickening with diffuse mild spinal canal narrowing  and mild bilateral neural foramen narrowing, slightly worsened from  previous.      L5-S1: Disc bulge, facet hypertrophy, and ligamentum flavum  thickening with mild bilateral neural foramen narrowing, similar to  previous.      The prevertebral and posterior paraspinous soft tissues are  unremarkable. Incidental partially visualized T2 hyperintense renal  cyst on the right.      IMPRESSION:  Mild progression of degenerative changes most pronounced at L4-5.      MACRO:  None      Signed by: Eze Stevenson 1/4/2024 3:55 PM  Dictation workstation:   [id number]     Assessment/Plan   Diagnoses and all orders for this visit:  Lumbosacral radiculopathy  Osteoarthritis of spine with radiculopathy, lumbosacral region  Fibromyalgia       Patient is a 44-year-old female with a past medical history significant for fibromyalgia,  lumbosacral spondylosis, lumbosacral stenosis, and lumbosacral stenosis.  We reviewed her recent MRI.  Based on her MRI findings, her pain pattern, and her failure to improve with conservative treatments I recommended to patient an L4-5 epidural steroid injection.  This will be done under fluoroscopy for both diagnostic and therapeutic purposes.  Diagnosis-M54.17-lumbosacral neuritis.  Procedure was discussed purpose and benefits were discussed.  Medication hold including vitamins for 1 week was discussed.  Patient is agreeable.  She will follow-up 2 weeks after the injection for reevaluation.  Call clinic sooner if necessary.  In regards to her fibromyalgia we discussed different options.  We discussed Lyrica.  50 mg twice daily.  Potential side effects were discussed.  How start the medication was discussed.  Patient previously had tiredness with gabapentin.  OARRS reviewed.

## 2024-02-27 NOTE — TELEPHONE ENCOUNTER
Patient was in our clinic today for a office visit.  She scored a 9 on our standard depression screen.  She stated it makes her daily life very difficult.  She denied suicidal thoughts or ideations.  She was given a handout with some area counseling resources.  Just wanted to keep you in the loop.  Thanks!

## 2024-02-27 NOTE — PROGRESS NOTES
Subjective   Patient ID: Ayde Ramírez is a 44 y.o. female who presents for Back Pain (Patient here to follow up from lumbar MRI.  Patient rates her pain a 5/10 now and it does get up to a 10/10.  She states lying, sitting, or walking can all increase her pain.  Patient states her pain is across her lower back and radiates down her legs to her feet.  She states she gets numbness/tingling in her feet.).    Falls risk NA due to age.  Depression screen completed, score 9.  Patient provided with [facility] resources handout.  She denied any suicidal thoughts or ideations.  Patient is a current smoker.  MU score 62.  ORT score 2.    Ana Tirado RN 02/27/24 10:15 AM       Patient is a 44-year-old female. She has a past medical history significant for lumbosacral spondylosis, lumbosacral neuritis and fibromyalgia.  Unfortunately, most recent RFA to the lumbar spine did not give her the relief she was looking for.  She continues have lower back pain with bilateral leg pain.  This is down to her foot/ankle.  It is a numbness and tingling.  She rates it a 5-10/10.  The more she gets up and tries to do.  The worst it gets.  It is worse with ambulation.  Worse with activity.  This significantly affects her quality of life.  Significantly affects her activities during the day.  Patient was delayed in follow-up due to her mother-in-law's illness.  She states that her mother-in-law passed away.  I gave condolences.  She states that things have just been very bothersome.  She also feels that her fibromyalgia is flared up.  She states that things was overall very uncomfortable at this time.  She states that history of gabapentin made her tired.  She is using Topamax and Flector patches With some improvement but once again, not quite enough.         Review of Systems   Constitutional: Negative.    HENT: Negative.     Eyes: Negative.    Respiratory: Negative.     Cardiovascular: Negative.    Gastrointestinal:  Negative.    Endocrine: Negative.    Genitourinary: Negative.    Musculoskeletal:  Positive for arthralgias, back pain, gait problem and myalgias.   Skin: Negative.    Allergic/Immunologic: Negative.    Hematological: Negative.    Psychiatric/Behavioral: Negative.         Objective   Physical Exam  Vitals and nursing note reviewed.   Constitutional:       Appearance: Normal appearance. She is obese.   HENT:      Head: Normocephalic and atraumatic.      Right Ear: External ear normal.      Left Ear: External ear normal.      Nose: Nose normal.      Mouth/Throat:      Pharynx: Oropharynx is clear.   Eyes:      Pupils: Pupils are equal, round, and reactive to light.   Cardiovascular:      Rate and Rhythm: Normal rate and regular rhythm.      Pulses: Normal pulses.   Pulmonary:      Effort: Pulmonary effort is normal.   Musculoskeletal:         General: Normal range of motion.      Cervical back: Normal range of motion.      Comments: 5/5 lower extremity strength   Skin:     General: Skin is warm and dry.   Neurological:      General: No focal deficit present.      Mental Status: She is alert and oriented to person, place, and time. Mental status is at baseline.   Psychiatric:         Mood and Affect: Mood normal.         Behavior: Behavior normal.         Thought Content: Thought content normal.         Judgment: Judgment normal.         MR lumbar spine wo IV contrast  Status: Final result     PACS Images     Show images for MR lumbar spine wo IV contrast  Signed by    Signed Time Phone Pager   Eze Stevenson MD PhD 1/04/2024 15:55 [phone]     Exam Information    Status Exam Begun Exam Ended   Final 1/04/2024 15:05 1/04/2024 15:45     Study Result    Narrative & Impression   Interpreted By:  Eze Stevenson,   STUDY:  MR LUMBAR SPINE WO IV CONTRAST;  1/4/2024 3:45 pm      INDICATION:  Signs/Symptoms: LUMBOSACRAL RADICULOPATHY.      COMPARISON:  Lumbar spine MRI, 01/29/2021 and lumbar spine radiographs,  05/23/2020      ACCESSION NUMBER(S):  [id number]      ORDERING CLINICIAN:  SEDA THACKER      TECHNIQUE:  Sagittal T1, T2, STIR, axial T1 and T2 weighted images of the lumbar  spine were acquired.      FINDINGS:  5 lumbar-type vertebral bodies are again noted, the last well-formed  disc space is labeled L5-S1.      Alignment: The vertebral alignment is maintained.      Vertebrae/Intervertebral Discs: The vertebral bodies demonstrate  expected height. Mild degenerative endplate changes are again noted,  no associated marrow edema. Mild multilevel loss of normal disc T2  signal intensity and disc height, slightly progressed from 2021.      Conus: The lower thoracic cord appears unremarkable. The conus  terminates at L1-2. The cauda equina is unremarkable.      T12-L1:  No canal or foraminal narrowing.      L1-2:  No canal or foraminal narrowing.      L2-3:  No canal or foraminal narrowing.      L3-4: Facet hypertrophy. No canal or foraminal narrowing.      L4-5: Disc bulge, facet hypertrophy, prominent epidural fat, and  ligamentum flavum thickening with diffuse mild spinal canal narrowing  and mild bilateral neural foramen narrowing, slightly worsened from  previous.      L5-S1: Disc bulge, facet hypertrophy, and ligamentum flavum  thickening with mild bilateral neural foramen narrowing, similar to  previous.      The prevertebral and posterior paraspinous soft tissues are  unremarkable. Incidental partially visualized T2 hyperintense renal  cyst on the right.      IMPRESSION:  Mild progression of degenerative changes most pronounced at L4-5.      MACRO:  None      Signed by: Eze Stevenson 1/4/2024 3:55 PM  Dictation workstation:   [id number]     Assessment/Plan   Diagnoses and all orders for this visit:  Lumbosacral radiculopathy  Osteoarthritis of spine with radiculopathy, lumbosacral region  Fibromyalgia       Patient is a 44-year-old female with a past medical history significant for fibromyalgia,  lumbosacral spondylosis, lumbosacral stenosis, and lumbosacral stenosis.  We reviewed her recent MRI.  Based on her MRI findings, her pain pattern, and her failure to improve with conservative treatments I recommended to patient an L4-5 epidural steroid injection.  This will be done under fluoroscopy for both diagnostic and therapeutic purposes.  Diagnosis-M54.17-lumbosacral neuritis.  Procedure was discussed purpose and benefits were discussed.  Medication hold including vitamins for 1 week was discussed.  Patient is agreeable.  She will follow-up 2 weeks after the injection for reevaluation.  Call clinic sooner if necessary.  In regards to her fibromyalgia we discussed different options.  We discussed Lyrica.  50 mg twice daily.  Potential side effects were discussed.  How start the medication was discussed.  Patient previously had tiredness with gabapentin.  OARRS reviewed.

## 2024-03-05 ENCOUNTER — PREP FOR PROCEDURE (OUTPATIENT)
Dept: PAIN MEDICINE | Facility: CLINIC | Age: 44
End: 2024-03-05
Payer: COMMERCIAL

## 2024-03-05 DIAGNOSIS — M54.17 LUMBOSACRAL NEURITIS: Primary | ICD-10-CM

## 2024-03-05 RX ORDER — SODIUM CHLORIDE 9 MG/ML
6 INJECTION, SOLUTION INTRAMUSCULAR; INTRAVENOUS; SUBCUTANEOUS ONCE
Status: CANCELLED | OUTPATIENT
Start: 2024-03-08 | End: 2024-03-05

## 2024-03-05 RX ORDER — BUPIVACAINE HYDROCHLORIDE 2.5 MG/ML
2 INJECTION, SOLUTION EPIDURAL; INFILTRATION; INTRACAUDAL ONCE
Status: CANCELLED | OUTPATIENT
Start: 2024-03-08 | End: 2024-03-05

## 2024-03-05 RX ORDER — DEXAMETHASONE SODIUM PHOSPHATE 10 MG/ML
10 INJECTION INTRAMUSCULAR; INTRAVENOUS ONCE
Status: CANCELLED | OUTPATIENT
Start: 2024-03-08 | End: 2024-03-05

## 2024-03-05 RX ORDER — LIDOCAINE HYDROCHLORIDE 10 MG/ML
10 INJECTION, SOLUTION EPIDURAL; INFILTRATION; INTRACAUDAL; PERINEURAL ONCE
Status: CANCELLED | OUTPATIENT
Start: 2024-03-08 | End: 2024-03-05

## 2024-03-06 NOTE — OP NOTE
Date: 3/8/2024    OR Location: [facility] OR    Name: Ayde Ramírez  : 1980 Age: 44 y.o.  MRN: [id number]  Sex: female     Diagnosis  Lumbar radiculopathy    Procedures  L4-5 interlaminar epidural steroid injection with fluoroscopy    Surgeons   Jaret Del Cid MD       Procedure Summary  Anesthesia: Local  ASA: ASA status not filed in the log.  Anesthesia Staff: Dr. Del Cid  Estimated Blood Loss: 0 mL  Intra-op Medications: * Intraprocedure medication information is unavailable because the case start and end events have not been set *      Intraprocedure I/O Totals       None           Specimen: No specimens collected       Indications: Ayde Ramírez is an 44 y.o. female who is having a L4-5 interlaminar epidural steroid injection with fluoroscopy for pain in the lower back which radiates into both legs.  Lumbar MRI from 2024 revealed mild degree of stenosis at the L4-5 level.  The patient was seen in the preoperative area. The risks, benefits, complications, treatment options, non-operative alternatives, expected recovery and outcomes were discussed with the patient. The possibilities of reaction to medication, injury to surrounding structures, bleeding and infection were discussed with the patient. The patient concurred with the proposed plan, giving informed consent.  The site of surgery was properly noted/marked.     Procedure Details: Patient was taken to the OR and placed on the procedure table with a pillow under the hips to assist with lumbar flexion.  Patient voiced that they were in a comfortable position.  Using aseptic technique the lower lumbar region was prepped with chlorhexidine solution and draped in a standard fashion.  Using fluoroscopy with assistance of the radiological technician: The L4-5 level was identified.  The skin and underlying tissue was anesthetized with 5 mL of 1% lidocaine.    Using both AP and lateral views with fluoroscopy a 3.5 inch number 18-gauge Touhy needle was placed  into the L4-5 epidural space using the loss-of-resistance technique without complication.  There is no blood or CSF emanating from the needle nor were there any paresthesias.  3 cc of Omnipaque 300 dye was injected with excellent epidural spread and no vascular uptake.  A total of 2 mL of 0.25 bupivacaine and 6 mL of sterile saline along with 10 mg dexamethasone PF was injected without complication.  The needle was then removed.    Patient tolerated the procedure well and was sent to the recovery room in stable condition.  Postop and follow-up instructions given to the patient.   Complications:  None; patient tolerated the procedure well.    Disposition: PACU - hemodynamically stable.  Condition: stable         Additional Details:       Jaret Del Cid MD

## 2024-03-08 ENCOUNTER — HOSPITAL ENCOUNTER (OUTPATIENT)
Dept: OPERATING ROOM | Facility: HOSPITAL | Age: 44
Setting detail: OUTPATIENT SURGERY
Discharge: HOME | End: 2024-03-08
Payer: COMMERCIAL

## 2024-03-08 ENCOUNTER — HOSPITAL ENCOUNTER (OUTPATIENT)
Dept: RADIOLOGY | Facility: HOSPITAL | Age: 44
Setting detail: OUTPATIENT SURGERY
Discharge: HOME | End: 2024-03-08
Payer: COMMERCIAL

## 2024-03-08 VITALS
OXYGEN SATURATION: 97 % | WEIGHT: 233 LBS | RESPIRATION RATE: 16 BRPM | TEMPERATURE: 98 F | SYSTOLIC BLOOD PRESSURE: 120 MMHG | HEIGHT: 63 IN | DIASTOLIC BLOOD PRESSURE: 80 MMHG | BODY MASS INDEX: 41.29 KG/M2 | HEART RATE: 91 BPM

## 2024-03-08 DIAGNOSIS — M54.17 LUMBOSACRAL NEURITIS: ICD-10-CM

## 2024-03-08 PROCEDURE — 62323 NJX INTERLAMINAR LMBR/SAC: CPT | Performed by: ANESTHESIOLOGY

## 2024-03-08 PROCEDURE — 2500000004 HC RX 250 GENERAL PHARMACY W/ HCPCS (ALT 636 FOR OP/ED): Performed by: PHYSICIAN ASSISTANT

## 2024-03-08 PROCEDURE — 2550000001 HC RX 255 CONTRASTS: Performed by: PHYSICIAN ASSISTANT

## 2024-03-08 PROCEDURE — 2500000005 HC RX 250 GENERAL PHARMACY W/O HCPCS: Performed by: PHYSICIAN ASSISTANT

## 2024-03-08 PROCEDURE — A4216 STERILE WATER/SALINE, 10 ML: HCPCS | Performed by: PHYSICIAN ASSISTANT

## 2024-03-08 RX ORDER — BUPIVACAINE HYDROCHLORIDE 2.5 MG/ML
2 INJECTION, SOLUTION EPIDURAL; INFILTRATION; INTRACAUDAL ONCE
Status: COMPLETED | OUTPATIENT
Start: 2024-03-08 | End: 2024-03-08

## 2024-03-08 RX ORDER — LIDOCAINE HYDROCHLORIDE 10 MG/ML
10 INJECTION, SOLUTION EPIDURAL; INFILTRATION; INTRACAUDAL; PERINEURAL ONCE
Status: COMPLETED | OUTPATIENT
Start: 2024-03-08 | End: 2024-03-08

## 2024-03-08 RX ORDER — SODIUM CHLORIDE 9 MG/ML
6 INJECTION, SOLUTION INTRAMUSCULAR; INTRAVENOUS; SUBCUTANEOUS ONCE
Status: COMPLETED | OUTPATIENT
Start: 2024-03-08 | End: 2024-03-08

## 2024-03-08 RX ORDER — DEXAMETHASONE SODIUM PHOSPHATE 10 MG/ML
10 INJECTION INTRAMUSCULAR; INTRAVENOUS ONCE
Status: COMPLETED | OUTPATIENT
Start: 2024-03-08 | End: 2024-03-08

## 2024-03-08 RX ADMIN — LIDOCAINE HYDROCHLORIDE 4 ML: 10 INJECTION, SOLUTION EPIDURAL; INFILTRATION; INTRACAUDAL; PERINEURAL at 12:39

## 2024-03-08 RX ADMIN — IOHEXOL 1.5 ML: 300 INJECTION, SOLUTION INTRAVENOUS at 12:40

## 2024-03-08 RX ADMIN — DEXAMETHASONE SODIUM PHOSPHATE 10 MG: 10 INJECTION INTRAMUSCULAR; INTRAVENOUS at 12:43

## 2024-03-08 RX ADMIN — SODIUM CHLORIDE 5 ML: 9 INJECTION, SOLUTION INTRAMUSCULAR; INTRAVENOUS; SUBCUTANEOUS at 12:42

## 2024-03-08 RX ADMIN — BUPIVACAINE HYDROCHLORIDE 2 MG: 2.5 INJECTION, SOLUTION EPIDURAL; INFILTRATION; INTRACAUDAL; PERINEURAL at 12:43

## 2024-03-08 ASSESSMENT — PAIN SCALES - GENERAL
PAINLEVEL_OUTOF10: 0 - NO PAIN
PAINLEVEL_OUTOF10: 7

## 2024-03-08 ASSESSMENT — PAIN - FUNCTIONAL ASSESSMENT
PAIN_FUNCTIONAL_ASSESSMENT: 0-10
PAIN_FUNCTIONAL_ASSESSMENT: 0-10

## 2024-03-08 NOTE — Clinical Note
Pt arrived to OR on cart. Patient self transferred to OR table with minimal assistance.  Positioned prone with elbows out at side, hands over top of bed, blanket roll under hips, safety strap over legs.  Pt prepped with Chloraprep.  Bandaids applied to injection sites. Pt. Transferred to ACS on cart.  Report given to Albina Sampson RN in PACU.

## 2024-03-25 DIAGNOSIS — I10 ESSENTIAL HYPERTENSION: ICD-10-CM

## 2024-03-25 RX ORDER — AMLODIPINE BESYLATE 5 MG/1
5 TABLET ORAL DAILY
Qty: 30 TABLET | Refills: 0 | Status: SHIPPED | OUTPATIENT
Start: 2024-03-25 | End: 2024-05-11

## 2024-04-01 ENCOUNTER — OFFICE VISIT (OUTPATIENT)
Dept: PAIN MEDICINE | Facility: CLINIC | Age: 44
End: 2024-04-01
Payer: COMMERCIAL

## 2024-04-01 VITALS
BODY MASS INDEX: 41.63 KG/M2 | DIASTOLIC BLOOD PRESSURE: 92 MMHG | SYSTOLIC BLOOD PRESSURE: 157 MMHG | WEIGHT: 235 LBS | HEART RATE: 101 BPM | RESPIRATION RATE: 16 BRPM

## 2024-04-01 DIAGNOSIS — M79.7 FIBROMYALGIA: ICD-10-CM

## 2024-04-01 DIAGNOSIS — M54.17 LUMBOSACRAL RADICULOPATHY: Primary | ICD-10-CM

## 2024-04-01 PROCEDURE — 99213 OFFICE O/P EST LOW 20 MIN: CPT

## 2024-04-01 RX ORDER — GABAPENTIN 300 MG/1
300 CAPSULE ORAL NIGHTLY
Qty: 30 CAPSULE | Refills: 2 | Status: SHIPPED | OUTPATIENT
Start: 2024-04-01

## 2024-04-01 ASSESSMENT — ENCOUNTER SYMPTOMS
SHORTNESS OF BREATH: 0
LIGHT-HEADEDNESS: 0
PALPITATIONS: 0
ALLERGIC/IMMUNOLOGIC NEGATIVE: 1
ENDOCRINE NEGATIVE: 1
ARTHRALGIAS: 0
DYSPHORIC MOOD: 0
WEAKNESS: 0
FACIAL ASYMMETRY: 0
WHEEZING: 0
MYALGIAS: 1
CONSTITUTIONAL NEGATIVE: 1
ADENOPATHY: 0
BRUISES/BLEEDS EASILY: 0
EYES NEGATIVE: 1
BACK PAIN: 1
COUGH: 0

## 2024-04-01 NOTE — PROGRESS NOTES
"Subjective   Patient ID: Ayde Ramírez is a 44 y.o. female who presents for Back Pain (FUV L4-5 QUINTEN on 3/8/24 reports 80% for 1 week then pain returned same as before. Today is having bilat lower back pain that goes into her Bilat legs she has neuropathy in her bilat feet rates 6/10 now and 10/10 at worst, describes as sharp. She has increased pain with activity. She takes a muscle relaxer and Lyrica, tylenol she states \"these do not help my pain as soon as I move my pain is back\"). MU score 58%, Alcohol screen negative.  She wants to discuss Lyrica and  will need a RF on Lyrica if she is staying on it send to  [facility].   Albina Sampson RN 04/01/24 11:05 AM     Patient is a 44-year-old female who presents today for an L4-5 epidural steroid injection on 3/8/2024.  Patient reports significant but short-lived relief with this injection, with 80% relief for the first week before the pain came back the same as before.  Today she rates her pain a 6/10 and bilateral lower back radiating down into her legs to her feet.  She says the pain can get up to a 10/10 with activity.  This interferes with her functional status at home her ADLs and her ability to do the things she wants to do.  She has been taking Lyrica 50 mg twice a day to try to help with the pain, but says this medication makes her nauseous to the point that last week she threw up.  She is asking if she could go back on gabapentin and only take it at night.  She was on this previously 3 times a day, but says it made her tired which is why she stopped taking it.  We discussed starting gabapentin at an equivalent dose to the Lyrica so that she does not have to wean down.  She is interested in any injection options we have to offer for her pain.        Review of Systems   Constitutional: Negative.    HENT: Negative.     Eyes: Negative.    Respiratory:  Negative for cough, shortness of breath and wheezing.    Cardiovascular:  Negative for chest pain, palpitations " and leg swelling.   Endocrine: Negative.    Genitourinary: Negative.    Musculoskeletal:  Positive for back pain and myalgias. Negative for arthralgias.   Skin: Negative.    Allergic/Immunologic: Negative.    Neurological:  Negative for facial asymmetry, weakness and light-headedness.   Hematological:  Negative for adenopathy. Does not bruise/bleed easily.   Psychiatric/Behavioral:  Negative for dysphoric mood and suicidal ideas.        Objective   Physical Exam  Constitutional:       General: She is not in acute distress.     Appearance: Normal appearance.   HENT:      Head: Normocephalic.      Mouth/Throat:      Mouth: Mucous membranes are moist.   Eyes:      Extraocular Movements: Extraocular movements intact.   Cardiovascular:      Rate and Rhythm: Normal rate and regular rhythm.      Pulses: Normal pulses.      Heart sounds: Normal heart sounds. No murmur heard.     No friction rub. No gallop.   Pulmonary:      Effort: Pulmonary effort is normal.      Breath sounds: Normal breath sounds. No wheezing, rhonchi or rales.   Abdominal:      General: Abdomen is flat.      Palpations: Abdomen is soft.   Musculoskeletal:      Cervical back: Normal range of motion.      Right lower leg: No edema.      Left lower leg: No edema.      Comments: Strength 5/5 bilateral lower extremities  Ambulates without assistance  SLR positive bilaterally   Lymphadenopathy:      Cervical: No cervical adenopathy.   Skin:     General: Skin is warm and dry.   Neurological:      General: No focal deficit present.      Mental Status: She is alert and oriented to person, place, and time. Mental status is at baseline.   Psychiatric:         Mood and Affect: Mood normal.         Behavior: Behavior normal.         Assessment/Plan   Diagnoses and all orders for this visit:  Lumbosacral radiculopathy  -     gabapentin (Neurontin) 300 mg capsule; Take 1 capsule (300 mg) by mouth once daily at bedtime.  Fibromyalgia       Patient is a 44-year-old  female with past medical history significant for fibromyalgia, lumbar sacral spondylosis, lumbosacral stenosis, and lumbosacral stenosis.  She is following up after an L4-5 epidural steroid injection for which she obtained significant relief lasting only a week before the pain returned.  We again reviewed her MRI and based on the findings, her pain pattern, her failure to improve with previous conservative treatment, I recommend proceeding with a bilateral L4-5 transforaminal epidural steroid injection under fluoroscopy for diagnostic and therapeutic purposes (M54.17 -lumbosacral neuritis).  The procedure was discussed, risks and benefits were discussed, med holds were discussed, patient is agreeable.  She will follow-up 2 weeks after the injection for reevaluation, call the clinic sooner if necessary.  She will start taking gabapentin 300 mg nightly.  PDMP reviewed.

## 2024-04-03 ENCOUNTER — TELEPHONE (OUTPATIENT)
Dept: PAIN MEDICINE | Facility: CLINIC | Age: 44
End: 2024-04-03
Payer: COMMERCIAL

## 2024-04-03 NOTE — TELEPHONE ENCOUNTER
BILATERAL L4-5 TFESI CPT 17189 DX M54.17  NO AUTH REQUIRED DECISION ID#: [id number] VALID 5/3/24-8/1/24

## 2024-04-18 ENCOUNTER — OFFICE VISIT (OUTPATIENT)
Dept: GASTROENTEROLOGY | Facility: CLINIC | Age: 44
End: 2024-04-18
Payer: COMMERCIAL

## 2024-04-18 DIAGNOSIS — K90.89 BILE SALT-INDUCED DIARRHEA (HHS-HCC): Primary | ICD-10-CM

## 2024-04-18 PROCEDURE — 3008F BODY MASS INDEX DOCD: CPT | Performed by: INTERNAL MEDICINE

## 2024-04-18 PROCEDURE — 99214 OFFICE O/P EST MOD 30 MIN: CPT | Performed by: INTERNAL MEDICINE

## 2024-04-18 RX ORDER — MONTELUKAST SODIUM 4 MG/1
1 TABLET, CHEWABLE ORAL
Qty: 60 TABLET | Refills: 5 | Status: SHIPPED | OUTPATIENT
Start: 2024-04-18 | End: 2025-04-18

## 2024-04-18 ASSESSMENT — ENCOUNTER SYMPTOMS
ABDOMINAL PAIN: 1
CONSTITUTIONAL NEGATIVE: 1
ENDOCRINE NEGATIVE: 1
HEMATOLOGIC/LYMPHATIC NEGATIVE: 1
NEUROLOGICAL NEGATIVE: 1
DIARRHEA: 1
CARDIOVASCULAR NEGATIVE: 1
RESPIRATORY NEGATIVE: 1
NAUSEA: 1
VOMITING: 1
EYES NEGATIVE: 1

## 2024-04-18 NOTE — PROGRESS NOTES
Subjective   Patient ID: Ayde Ramírez is a 44 y.o. female who presents for Follow-up (EGD and colonoscopy in January ), Nausea (occasionally), Vomiting (occasionally), and Diarrhea.  Vomiting   Associated symptoms include abdominal pain and diarrhea.   Diarrhea   Associated symptoms include abdominal pain and vomiting.     Patient is seen today in follow-up.  Underwent EGD colonoscopy within the last several months.  EGD revealed bile reflux gastritis.  Colonoscopy was unremarkable.  Her symptoms are largely unchanged.  Largely revolve around diarrhea averaging 4-6 bowel movements daily worse after meals.  Stools are loose float on top of the toilet often oily.  Very rarely foamy and yellow.  Denies any weight loss.  Since beginning insidiously after cholecystectomy.  Review of Systems   Constitutional: Negative.    HENT: Negative.     Eyes: Negative.    Respiratory: Negative.     Cardiovascular: Negative.    Gastrointestinal:  Positive for abdominal pain, diarrhea, nausea and vomiting.   Endocrine: Negative.    Genitourinary: Negative.    Neurological: Negative.    Hematological: Negative.        Objective   Physical Exam  Vitals and nursing note reviewed.   Constitutional:       Appearance: Normal appearance.   HENT:      Head: Normocephalic.      Mouth/Throat:      Mouth: Mucous membranes are moist.      Pharynx: Oropharynx is clear.   Eyes:      Conjunctiva/sclera: Conjunctivae normal.      Pupils: Pupils are equal, round, and reactive to light.   Cardiovascular:      Pulses: Normal pulses.      Heart sounds: Normal heart sounds.   Pulmonary:      Effort: Pulmonary effort is normal.      Breath sounds: Normal breath sounds.   Abdominal:      General: Abdomen is flat. Bowel sounds are normal.      Palpations: Abdomen is soft.   Musculoskeletal:      Cervical back: Normal range of motion and neck supple.   Skin:     General: Skin is warm and dry.   Neurological:      General: No focal deficit present.       Mental Status: She is alert and oriented to person, place, and time.   Psychiatric:         Behavior: Behavior normal.         Assessment/Plan   Diagnoses and all orders for this visit:  Bile salt-induced diarrhea (HHS-HCC)  -     colestipol (Colestid) 1 gram tablet; Take 1 tablet (1 g) by mouth 2 times a day after meals. Take at least 1 hour after or 4 hours before other medications.    Recommend trial of Colestid 1 g twice daily follow-up in 3 months       Farhat Joya DO 04/18/24 1:51 PM

## 2024-05-08 DIAGNOSIS — I10 ESSENTIAL HYPERTENSION: ICD-10-CM

## 2024-05-08 DIAGNOSIS — Z30.41 ENCOUNTER FOR SURVEILLANCE OF CONTRACEPTIVE PILLS: Primary | ICD-10-CM

## 2024-05-08 RX ORDER — NORETHINDRONE 0.35 MG/1
1 TABLET ORAL DAILY
Qty: 84 TABLET | Refills: 0 | Status: SHIPPED | OUTPATIENT
Start: 2024-05-08

## 2024-05-10 DIAGNOSIS — I47.19 ATRIAL TACHYCARDIA (CMS-HCC): Primary | ICD-10-CM

## 2024-05-10 RX ORDER — ATENOLOL 50 MG/1
TABLET ORAL
Qty: 270 TABLET | Refills: 1 | Status: SHIPPED | OUTPATIENT
Start: 2024-05-10

## 2024-05-11 RX ORDER — AMLODIPINE BESYLATE 5 MG/1
5 TABLET ORAL DAILY
Qty: 30 TABLET | Refills: 0 | Status: SHIPPED | OUTPATIENT
Start: 2024-05-11

## 2024-05-21 DIAGNOSIS — E11.65 UNCONTROLLED TYPE 2 DIABETES MELLITUS WITH HYPERGLYCEMIA (MULTI): Primary | ICD-10-CM

## 2024-06-01 PROBLEM — Z79.899 IMMUNOSUPPRESSION DUE TO DRUG THERAPY (MULTI): Status: ACTIVE | Noted: 2024-06-01

## 2024-06-01 PROBLEM — D84.821 IMMUNOSUPPRESSION DUE TO DRUG THERAPY (MULTI): Status: ACTIVE | Noted: 2024-06-01

## 2024-06-01 RX ORDER — METFORMIN HYDROCHLORIDE 500 MG/1
1000 TABLET, EXTENDED RELEASE ORAL
COMMUNITY
Start: 2024-04-12

## 2024-06-03 ENCOUNTER — OFFICE VISIT (OUTPATIENT)
Dept: RHEUMATOLOGY | Facility: CLINIC | Age: 44
End: 2024-06-03
Payer: COMMERCIAL

## 2024-06-03 VITALS
DIASTOLIC BLOOD PRESSURE: 75 MMHG | TEMPERATURE: 96.7 F | OXYGEN SATURATION: 95 % | HEIGHT: 63 IN | WEIGHT: 233.9 LBS | HEART RATE: 86 BPM | BODY MASS INDEX: 41.45 KG/M2 | SYSTOLIC BLOOD PRESSURE: 102 MMHG

## 2024-06-03 DIAGNOSIS — F33.1 MODERATE EPISODE OF RECURRENT MAJOR DEPRESSIVE DISORDER (MULTI): ICD-10-CM

## 2024-06-03 DIAGNOSIS — D84.821 IMMUNOSUPPRESSION DUE TO DRUG THERAPY (MULTI): ICD-10-CM

## 2024-06-03 DIAGNOSIS — M32.9 SYSTEMIC LUPUS ERYTHEMATOSUS, UNSPECIFIED SLE TYPE, UNSPECIFIED ORGAN INVOLVEMENT STATUS (MULTI): Primary | ICD-10-CM

## 2024-06-03 DIAGNOSIS — I47.19 ATRIAL TACHYCARDIA (CMS-HCC): ICD-10-CM

## 2024-06-03 DIAGNOSIS — E66.01 CLASS 3 SEVERE OBESITY DUE TO EXCESS CALORIES WITH SERIOUS COMORBIDITY AND BODY MASS INDEX (BMI) OF 40.0 TO 44.9 IN ADULT (MULTI): ICD-10-CM

## 2024-06-03 DIAGNOSIS — E11.65 TYPE 2 DIABETES MELLITUS WITH HYPERGLYCEMIA, WITHOUT LONG-TERM CURRENT USE OF INSULIN (MULTI): ICD-10-CM

## 2024-06-03 DIAGNOSIS — M79.7 FIBROMYALGIA: ICD-10-CM

## 2024-06-03 DIAGNOSIS — Z79.899 IMMUNOSUPPRESSION DUE TO DRUG THERAPY (MULTI): ICD-10-CM

## 2024-06-03 LAB
NON-UH HIE A/G RATIO: 1.3
NON-UH HIE ALB: 3.8 G/DL (ref 3.4–5)
NON-UH HIE ALK PHOS: 92 UNIT/L (ref 45–117)
NON-UH HIE BASO COUNT: 0.07 X1000 (ref 0–0.2)
NON-UH HIE BASOS %: 0.6 %
NON-UH HIE BILIRUBIN, TOTAL: 0.3 MG/DL (ref 0.3–1.2)
NON-UH HIE BUN/CREAT RATIO: 11.2
NON-UH HIE BUN: 9 MG/DL (ref 9–23)
NON-UH HIE C-REACTIVE PROTEIN, QUANTITATIVE: 1.9 MG/DL (ref 0–0.9)
NON-UH HIE CALCIUM: 10 MG/DL (ref 8.7–10.4)
NON-UH HIE CALCULATED OSMOLALITY: 282 MOSM/KG (ref 275–295)
NON-UH HIE CHLORIDE: 105 MMOL/L (ref 98–107)
NON-UH HIE CO2, VENOUS: 27 MMOL/L (ref 20–31)
NON-UH HIE CREATININE: 0.8 MG/DL (ref 0.5–0.8)
NON-UH HIE DIFF?: NO
NON-UH HIE EOS COUNT: 0.27 X1000 (ref 0–0.5)
NON-UH HIE EOSIN %: 2.1 %
NON-UH HIE GFR AA: >60
NON-UH HIE GLOBULIN: 3 G/DL
NON-UH HIE GLOMERULAR FILTRATION RATE: >60 ML/MIN/1.73M?
NON-UH HIE GLUCOSE: 139 MG/DL (ref 74–106)
NON-UH HIE GOT: 18 UNIT/L (ref 15–37)
NON-UH HIE GPT: 33 UNIT/L (ref 10–49)
NON-UH HIE HCT: 42.8 % (ref 36–46)
NON-UH HIE HGB: 14.4 G/DL (ref 12–16)
NON-UH HIE INSTR WBC: 12.8
NON-UH HIE K: 4.4 MMOL/L (ref 3.5–5.1)
NON-UH HIE LYMPH %: 33.1 %
NON-UH HIE LYMPH COUNT: 4.24 X1000 (ref 1.2–4.8)
NON-UH HIE MCH: 29.5 PG (ref 27–34)
NON-UH HIE MCHC: 33.5 G/DL (ref 32–37)
NON-UH HIE MCV: 88.1 FL (ref 80–100)
NON-UH HIE MONO %: 5.2 %
NON-UH HIE MONO COUNT: 0.66 X1000 (ref 0.1–1)
NON-UH HIE MPV: 8.9 FL (ref 7.4–10.4)
NON-UH HIE NA: 141 MMOL/L (ref 135–145)
NON-UH HIE NEUTROPHIL %: 59 %
NON-UH HIE NEUTROPHIL COUNT (ANC): 7.56 X1000 (ref 1.4–8.8)
NON-UH HIE NUCLEATED RBC: 0 /100WBC
NON-UH HIE PLATELET: 384 X10 (ref 150–450)
NON-UH HIE RBC: 4.86 X10 (ref 4.2–5.4)
NON-UH HIE RDW: 13.9 % (ref 11.5–14.5)
NON-UH HIE SED RATE WESTERGREN: 14 MM/HR (ref 0–20)
NON-UH HIE TOTAL PROTEIN: 6.8 G/DL (ref 5.7–8.2)
NON-UH HIE WBC: 12.8 X10 (ref 4.5–11)

## 2024-06-03 PROCEDURE — 3074F SYST BP LT 130 MM HG: CPT | Performed by: INTERNAL MEDICINE

## 2024-06-03 PROCEDURE — 3008F BODY MASS INDEX DOCD: CPT | Performed by: INTERNAL MEDICINE

## 2024-06-03 PROCEDURE — 99214 OFFICE O/P EST MOD 30 MIN: CPT | Performed by: INTERNAL MEDICINE

## 2024-06-03 PROCEDURE — 3078F DIAST BP <80 MM HG: CPT | Performed by: INTERNAL MEDICINE

## 2024-06-03 PROCEDURE — 4004F PT TOBACCO SCREEN RCVD TLK: CPT | Performed by: INTERNAL MEDICINE

## 2024-06-03 ASSESSMENT — ENCOUNTER SYMPTOMS
COUGH: 0
ARTHRALGIAS: 1
BACK PAIN: 0
SHORTNESS OF BREATH: 0
COLOR CHANGE: 0
MYALGIAS: 1
NUMBNESS: 0
FEVER: 0
JOINT SWELLING: 1
WEAKNESS: 0
FATIGUE: 1

## 2024-06-03 ASSESSMENT — PATIENT HEALTH QUESTIONNAIRE - PHQ9
1. LITTLE INTEREST OR PLEASURE IN DOING THINGS: NOT AT ALL
SUM OF ALL RESPONSES TO PHQ9 QUESTIONS 1 AND 2: 0
2. FEELING DOWN, DEPRESSED OR HOPELESS: NOT AT ALL

## 2024-06-03 NOTE — ASSESSMENT & PLAN NOTE
On cellcept and prednisone but still with daily symptoms.  Will try to get insurance to cover saphnelo again once labs back.  Labs ordered today to monitor for increased disease activity, end organ manifestations and to monitor for any drug toxicities due to chronic medications

## 2024-06-03 NOTE — PATIENT INSTRUCTIONS
It was a pleasure to see you today  Please call if your symptoms worsen  Please review your summary for education and reminders.  Follow up at your next appointment.    If you had labs/xrays done today, you will be able to view on [employer].   We will contact you when the results are reviewed for further discussion.  Please note that you may receive your results before I have had a chance to review.  Please know I will be contacting you for discussion  Homegoing instructions for all patient  A healthy lifestyle helps chronic diseases  These are all the goals you should strive to improve your overall health   Blood pressure <130/85   BMI of <30 or waist circumference that is 1/2 of your height   Fasting blood sugar <107 (if you are diabetic, aim for an A1c <6.4%_   LDL cholesterol <130   Avoid smoking   Manage your stress   Get your preventive exams   Get your immunizations   Some guidelines for all patients with lupus  Wear sunscreen, even on cloudy days.   Sun can worsening rashes and cause fatigue and flares  Exercise.  Movement is medicine.   Even stretches and light yoga count  If you smoke, stop.  Smoking makes skin manifestations of lupus much harder to treat.  Lupus (and the medications you need to be on) increase infection risk.  Keep up to date with your vaccinations  Lupus increases heart and stroke risk.  Make sure you work to get your cholesterol and blood pressure under control. If you have diabetes, it is important to get your A1c below 7%  If you are on hydroxychloroquine (plaquenil), make sure you see your eye doctor regularly and get testing to monitor for any potential changes  Think about your bone health.   Osteoporosis is a risk, especially if you are or have been on steroids.      Mental health can be an issue.   Not only does having a diagnosis of lupus cause depression but lupus itself can be the cause of mental health issues.  If you feel you need help, please call me or your primary care  doctor.   We are here for you     Quitting Smoking    Quitting smoking is the most important step you can take to improve your health. We're glad you have set a goal to improve your health.    Quit Smoking Resources    In addition to medications, use the STAR plan to help you successfully quit.   Stick with your quit date!   Tell friends, family, and coworkers your quit date. Request their understanding and support.  Anticipate and prepare for challenges. Some examples are withdrawal symptoms, being around others who smoke, and drinking alcohol.  Remove all tobacco products and paraphernalia from your environment. Make your home and vehicles smoke-free.    Free resources for additional support:  National tobacco quitline: 1-800-QUIT-NOW (1-591.383.7351).  SmokefreeTXT is a free text program to assist you in quitting. Visit https://www.smokefree.gov/smokefreetxt for more information.  Feel free to call your care manager at ([phone]) for additional support.    Current weight: 106 kg (233 lb 14.4 oz)  Weight change since last visit (-) denotes wt loss -1.1 lbs   Weight loss needed to achieve BMI 25: 93.1 Lbs  Weight loss needed to achieve BMI 30: 64.9 Lbs

## 2024-06-03 NOTE — PROGRESS NOTES
[facility] RHEUMATOLOGY     AND INTERNAL MEDICINE    RHEUMATOLOGY PROGRESS NOTE  Ayde Pinkkingsley 44 y.o. female  Chief Complaint   Patient presents with    Lupus       SUBJECTIVE  Still unable to be on saphnelo per insurance.   Pt still with symptoms.  Joint pain, fatigue.   Symptoms with sun exposure.  Occasional cracking of edge of lips but no oral ulcers.  Stable but wants to feel better      Records since last seen reviewed in [facility], [facility] and [facility] Record  Patient Active Problem List    Diagnosis Date Noted    Immunosuppression due to drug therapy (Multi) 06/01/2024    Atrial tachycardia (CMS-HCC) 08/24/2023    Chronic diarrhea 08/24/2023    Fibromyalgia 08/24/2023    Hypothyroidism, adult 08/24/2023    Iron deficiency 08/24/2023    Leukocytosis 08/24/2023    Lumbosacral radiculopathy 08/24/2023    Lumbosacral spondylosis 08/24/2023    Migraine without aura and without status migrainosus, not intractable 08/24/2023    TAZ on CPAP 08/24/2023    Moderate episode of recurrent major depressive disorder (Multi) 08/24/2023    Systemic lupus erythematosus (Multi) 08/24/2023    Class 3 severe obesity due to excess calories with serious comorbidity and body mass index (BMI) of 40.0 to 44.9 in adult (Multi) 08/24/2023    Iron deficiency anemia 08/24/2023    Chronic GERD 04/11/2023    Essential hypertension 04/11/2023    Vitamin D deficiency 04/11/2023    Mild hyperlipidemia 04/11/2023    Type 2 diabetes mellitus with hyperglycemia, without long-term current use of insulin (Multi) 05/17/2022    Hidradenitis suppurativa 03/09/2022     Past Medical History:   Diagnosis Date    Amenorrhea 12/13/2019    History of amenorrhea    Caffeine use     Chronic pancreatitis (Multi) 08/24/2023    Chronic sinusitis 08/24/2023    COVID-19 vaccine series declined     Disease of thyroid gland     Other hypersomnia 01/07/2021    Excessive daytime sleepiness    Personal  history of urinary calculi 08/01/2018    History of renal calculi    Secondary pancreatic insufficiency ([facility]) 08/24/2023     Current Outpatient Medications   Medication Instructions    amLODIPine (NORVASC) 5 mg, oral, Daily    atenolol (Tenormin) 50 mg tablet Take 2 tablets in am and 1 tablet in the pm.    atorvastatin (LIPITOR) 20 mg, oral, Nightly    blood sugar diagnostic (Blood Glucose Test) strip TEST FOUR TIMES A DAY.    cholecalciferol (VITAMIN D-3) 25 mcg, oral, Daily    clindamycin (Cleocin T) 1 % lotion Topical, 2 times daily    colestipol (COLESTID) 1 g, oral, 2 times daily after meals, Take at least 1 hour after or 4 hours before other medications.    cyanocobalamin (VITAMIN B-12) 1,000 mcg, oral, Daily    cyclobenzaprine (Flexeril) 10 mg tablet oral    dapagliflozin (Farxiga) 5 mg 1 tablet, oral, Daily    diclofenac sodium 1 % kit Topical, 4 times daily PRN    dulaglutide (Trulicity) 3 mg/0.5 mL pen injector Inject 1 pan once a week    DULoxetine (CYMBALTA) 60 mg, oral, 2 times daily    folic acid (Folvite) 1 mg tablet 1 tablet, oral, Daily    gabapentin (NEURONTIN) 300 mg, oral, Nightly    Jencycla 0.35 mg, oral, Daily    levothyroxine (SYNTHROID, LEVOXYL) 25 mcg, oral, Daily    lipase-protease-amylase (Zenpep) 40,000-126,000- 168,000 unit capsule,delayed release(DR/EC) oral    magnesium chloride (MagDelay) 64 mg EC tablet oral    meclizine (Antivert) 12.5 mg tablet oral    metFORMIN XR (GLUCOPHAGE-XR) 1,000 mg, oral, 2 times daily (morning and late afternoon)    metroNIDAZOLE (Metrogel) 0.75 % gel Topical    mycophenolate (Cellcept) 500 mg tablet oral    omeprazole (PRILOSEC) 20 mg, oral, Daily    ondansetron (ZOFRAN) 8 mg, oral, 3 times daily PRN    predniSONE (Deltasone) 10 mg tablet 1 tablet, oral, Daily    semaglutide (RYBELSUS) 3 mg, oral, Daily    simethicone (GAS RELIEF (SIMETHICONE)) 80 mg, oral, Every 6 hours PRN    spironolactone (ALDACTONE) 25 mg, oral, Daily    topiramate (Topamax) 100  "mg tablet oral, 2 times daily     Allergies   Allergen Reactions    Duloxetine Unknown    Adhesive Tape-Silicones Rash     Review of Systems   Constitutional:  Positive for fatigue. Negative for fever.   HENT:  Negative for mouth sores.    Respiratory:  Negative for cough and shortness of breath.    Cardiovascular:  Negative for leg swelling.   Musculoskeletal:  Positive for arthralgias, joint swelling and myalgias. Negative for back pain and gait problem.   Skin:  Positive for rash. Negative for color change.   Neurological:  Negative for weakness and numbness.     Social History     Tobacco Use    Smoking status: Every Day     Current packs/day: 0.50     Average packs/day: 0.5 packs/day for 20.0 years (10.0 ttl pk-yrs)     Types: Cigarettes    Smokeless tobacco: Never   Vaping Use    Vaping status: Never Used   Substance Use Topics    Alcohol use: Never    Drug use: Never     PHYSICAL EXAM  /75   Pulse 86   Temp 35.9 °C (96.7 °F)   Ht 1.6 m (5' 3\")   Wt 106 kg (233 lb 14.4 oz)   LMP 02/24/2023   SpO2 95%   BMI 41.43 kg/m²   Physical Exam  Vitals reviewed.   Constitutional:       General: She is not in acute distress.     Appearance: Normal appearance.   HENT:      Head: Normocephalic and atraumatic.   Eyes:      General: No scleral icterus.     Extraocular Movements: Extraocular movements intact.      Conjunctiva/sclera: Conjunctivae normal.      Pupils: Pupils are equal, round, and reactive to light.   Pulmonary:      Effort: Pulmonary effort is normal. No respiratory distress.   Musculoskeletal:         General: Swelling (dorsum of hand swelling) present. No tenderness or deformity.      Cervical back: Normal range of motion and neck supple. No tenderness.      Right lower leg: No edema.      Left lower leg: No edema.      Comments: No synovitis of examined joints   Skin:     Coloration: Skin is not pale.      Findings: Rash present. No erythema.   Neurological:      General: No focal deficit " present.      Mental Status: She is alert and oriented to person, place, and time. Mental status is at baseline.      Gait: Gait normal.   Psychiatric:         Mood and Affect: Mood normal.       Health Maintenance Due   Topic Date Due    HIV Screening  Never done    Diabetes: Foot Exam  Never done    Diabetes: Retinopathy Screening  Never done    Hepatitis B Vaccines (1 of 3 - 19+ 3-dose series) Never done    Diabetes: Urine Protein Screening  Never done    Zoster Vaccines (1 of 2) Never done    DTaP/Tdap/Td Vaccines (1 - Tdap) 09/16/2004    Mammogram  Never done    Lipid Panel  01/13/2023    Diabetes: Hemoglobin A1C  06/10/2023    TSH Level  06/24/2023    Colorectal Cancer Screening  02/18/2024     Assessment/plan  Problem List Items Addressed This Visit       Atrial tachycardia (CMS-HCC)    Current Assessment & Plan     Stable.   Not tachycardic today.   Monitored by PCP         Fibromyalgia    Moderate episode of recurrent major depressive disorder (Multi)    Current Assessment & Plan     Stable.  No signs of worsening.  Managed by PCP         Systemic lupus erythematosus (Multi) - Primary    Overview     Mycophenylate, prednisone         Current Assessment & Plan     On cellcept and prednisone but still with daily symptoms.  Will try to get insurance to cover saphnelo again once labs back.  Labs ordered today to monitor for increased disease activity, end organ manifestations and to monitor for any drug toxicities due to chronic medications           Relevant Orders    Anti-DNA Antibody, Double-Stranded    C3 Complement    C4 Complement    CBC and Auto Differential    Comprehensive Metabolic Panel    C-Reactive Protein    Sedimentation Rate    Type 2 diabetes mellitus with hyperglycemia, without long-term current use of insulin (Multi)    Current Assessment & Plan     Managed and monitored by PCP.   Glucose ordered today         Class 3 severe obesity due to excess calories with serious comorbidity and body  mass index (BMI) of 40.0 to 44.9 in adult (Multi)    Immunosuppression due to drug therapy (Multi)     Follow up: ___4__months

## 2024-06-03 NOTE — LETTER
Angelika 3, 2024     Nalini Calabrese, APRN-CNP  [address]  [address]  [facility] [address]    Patient: Ayde Ramírez   YOB: 1980   Date of Visit: 6/3/2024       Dear Dr. Nalini Calabrese, APRN-CNP:    Thank you for referring Ayde Ramírez to me for evaluation. Below are my notes for this visit.  If you have questions, please do not hesitate to call me. I look forward to following your patient along with you.       Sincerely,     Karen Celis MD      CC: No Recipients  ______________________________________________________________________________________                                                    [facility] RHEUMATOLOGY     AND INTERNAL MEDICINE    RHEUMATOLOGY PROGRESS NOTE  Ayde Ramírez 44 y.o. female  Chief Complaint   Patient presents with   • Lupus       SUBJECTIVE  Still unable to be on saphnelo per insurance.   Pt still with symptoms.  Joint pain, fatigue.   Symptoms with sun exposure.  Occasional cracking of edge of lips but no oral ulcers.  Stable but wants to feel better      Records since last seen reviewed in [facility], [facility] and [facility] Record  Patient Active Problem List    Diagnosis Date Noted   • Immunosuppression due to drug therapy (Multi) 06/01/2024   • Atrial tachycardia (CMS-[facility]) 08/24/2023   • Chronic diarrhea 08/24/2023   • Fibromyalgia 08/24/2023   • Hypothyroidism, adult 08/24/2023   • Iron deficiency 08/24/2023   • Leukocytosis 08/24/2023   • Lumbosacral radiculopathy 08/24/2023   • Lumbosacral spondylosis 08/24/2023   • Migraine without aura and without status migrainosus, not intractable 08/24/2023   • TAZ on CPAP 08/24/2023   • Moderate episode of recurrent major depressive disorder (Multi) 08/24/2023   • Systemic lupus erythematosus (Multi) 08/24/2023   • Class 3 severe obesity due to excess calories with serious comorbidity and body mass index (BMI) of 40.0 to 44.9 in adult (Multi) 08/24/2023   • Iron deficiency anemia 08/24/2023   • Chronic GERD  04/11/2023   • Essential hypertension 04/11/2023   • Vitamin D deficiency 04/11/2023   • Mild hyperlipidemia 04/11/2023   • Type 2 diabetes mellitus with hyperglycemia, without long-term current use of insulin (Multi) 05/17/2022   • Hidradenitis suppurativa 03/09/2022     Past Medical History:   Diagnosis Date   • Amenorrhea 12/13/2019    History of amenorrhea   • Caffeine use    • Chronic pancreatitis (Multi) 08/24/2023   • Chronic sinusitis 08/24/2023   • COVID-19 vaccine series declined    • Disease of thyroid gland    • Other hypersomnia 01/07/2021    Excessive daytime sleepiness   • Personal history of urinary calculi 08/01/2018    History of renal calculi   • Secondary pancreatic insufficiency ([facility]) 08/24/2023     Current Outpatient Medications   Medication Instructions   • amLODIPine (NORVASC) 5 mg, oral, Daily   • atenolol (Tenormin) 50 mg tablet Take 2 tablets in am and 1 tablet in the pm.   • atorvastatin (LIPITOR) 20 mg, oral, Nightly   • blood sugar diagnostic (Blood Glucose Test) strip TEST FOUR TIMES A DAY.   • cholecalciferol (VITAMIN D-3) 25 mcg, oral, Daily   • clindamycin (Cleocin T) 1 % lotion Topical, 2 times daily   • colestipol (COLESTID) 1 g, oral, 2 times daily after meals, Take at least 1 hour after or 4 hours before other medications.   • cyanocobalamin (VITAMIN B-12) 1,000 mcg, oral, Daily   • cyclobenzaprine (Flexeril) 10 mg tablet oral   • dapagliflozin (Farxiga) 5 mg 1 tablet, oral, Daily   • diclofenac sodium 1 % kit Topical, 4 times daily PRN   • dulaglutide (Trulicity) 3 mg/0.5 mL pen injector Inject 1 pan once a week   • DULoxetine (CYMBALTA) 60 mg, oral, 2 times daily   • folic acid (Folvite) 1 mg tablet 1 tablet, oral, Daily   • gabapentin (NEURONTIN) 300 mg, oral, Nightly   • Jencycla 0.35 mg, oral, Daily   • levothyroxine (SYNTHROID, LEVOXYL) 25 mcg, oral, Daily   • lipase-protease-amylase (Zenpep) 40,000-126,000- 168,000 unit capsule,delayed release(/EC) oral   •  "magnesium chloride (MagDelay) 64 mg EC tablet oral   • meclizine (Antivert) 12.5 mg tablet oral   • metFORMIN XR (GLUCOPHAGE-XR) 1,000 mg, oral, 2 times daily (morning and late afternoon)   • metroNIDAZOLE (Metrogel) 0.75 % gel Topical   • mycophenolate (Cellcept) 500 mg tablet oral   • omeprazole (PRILOSEC) 20 mg, oral, Daily   • ondansetron (ZOFRAN) 8 mg, oral, 3 times daily PRN   • predniSONE (Deltasone) 10 mg tablet 1 tablet, oral, Daily   • semaglutide (RYBELSUS) 3 mg, oral, Daily   • simethicone (GAS RELIEF (SIMETHICONE)) 80 mg, oral, Every 6 hours PRN   • spironolactone (ALDACTONE) 25 mg, oral, Daily   • topiramate (Topamax) 100 mg tablet oral, 2 times daily     Allergies   Allergen Reactions   • Duloxetine Unknown   • Adhesive Tape-Silicones Rash     Review of Systems   Constitutional:  Positive for fatigue. Negative for fever.   HENT:  Negative for mouth sores.    Respiratory:  Negative for cough and shortness of breath.    Cardiovascular:  Negative for leg swelling.   Musculoskeletal:  Positive for arthralgias, joint swelling and myalgias. Negative for back pain and gait problem.   Skin:  Positive for rash. Negative for color change.   Neurological:  Negative for weakness and numbness.     Social History     Tobacco Use   • Smoking status: Every Day     Current packs/day: 0.50     Average packs/day: 0.5 packs/day for 20.0 years (10.0 ttl pk-yrs)     Types: Cigarettes   • Smokeless tobacco: Never   Vaping Use   • Vaping status: Never Used   Substance Use Topics   • Alcohol use: Never   • Drug use: Never     PHYSICAL EXAM  /75   Pulse 86   Temp 35.9 °C (96.7 °F)   Ht 1.6 m (5' 3\")   Wt 106 kg (233 lb 14.4 oz)   LMP 02/24/2023   SpO2 95%   BMI 41.43 kg/m²   Physical Exam  Vitals reviewed.   Constitutional:       General: She is not in acute distress.     Appearance: Normal appearance.   HENT:      Head: Normocephalic and atraumatic.   Eyes:      General: No scleral icterus.     Extraocular " Movements: Extraocular movements intact.      Conjunctiva/sclera: Conjunctivae normal.      Pupils: Pupils are equal, round, and reactive to light.   Pulmonary:      Effort: Pulmonary effort is normal. No respiratory distress.   Musculoskeletal:         General: Swelling (dorsum of hand swelling) present. No tenderness or deformity.      Cervical back: Normal range of motion and neck supple. No tenderness.      Right lower leg: No edema.      Left lower leg: No edema.      Comments: No synovitis of examined joints   Skin:     Coloration: Skin is not pale.      Findings: Rash present. No erythema.   Neurological:      General: No focal deficit present.      Mental Status: She is alert and oriented to person, place, and time. Mental status is at baseline.      Gait: Gait normal.   Psychiatric:         Mood and Affect: Mood normal.       Health Maintenance Due   Topic Date Due   • HIV Screening  Never done   • Diabetes: Foot Exam  Never done   • Diabetes: Retinopathy Screening  Never done   • Hepatitis B Vaccines (1 of 3 - 19+ 3-dose series) Never done   • Diabetes: Urine Protein Screening  Never done   • Zoster Vaccines (1 of 2) Never done   • DTaP/Tdap/Td Vaccines (1 - Tdap) 09/16/2004   • Mammogram  Never done   • Lipid Panel  01/13/2023   • Diabetes: Hemoglobin A1C  06/10/2023   • TSH Level  06/24/2023   • Colorectal Cancer Screening  02/18/2024     Assessment/plan  Problem List Items Addressed This Visit       Atrial tachycardia (CMS-HCC)    Current Assessment & Plan     Stable.   Not tachycardic today.   Monitored by PCP         Fibromyalgia    Moderate episode of recurrent major depressive disorder (Multi)    Current Assessment & Plan     Stable.  No signs of worsening.  Managed by PCP         Systemic lupus erythematosus (Multi) - Primary    Overview     Mycophenylate, prednisone         Current Assessment & Plan     On cellcept and prednisone but still with daily symptoms.  Will try to get insurance to cover  saphnelo again once labs back.  Labs ordered today to monitor for increased disease activity, end organ manifestations and to monitor for any drug toxicities due to chronic medications           Relevant Orders    Anti-DNA Antibody, Double-Stranded    C3 Complement    C4 Complement    CBC and Auto Differential    Comprehensive Metabolic Panel    C-Reactive Protein    Sedimentation Rate    Type 2 diabetes mellitus with hyperglycemia, without long-term current use of insulin (Multi)    Current Assessment & Plan     Managed and monitored by PCP.   Glucose ordered today         Class 3 severe obesity due to excess calories with serious comorbidity and body mass index (BMI) of 40.0 to 44.9 in adult (Multi)    Immunosuppression due to drug therapy (Multi)     Follow up: ___4__months

## 2024-06-04 LAB — NON-UH HIE ANTI-DNA: NEGATIVE

## 2024-06-05 LAB
NON-UH HIE COMPLEMENT C3: 182 MG/DL (ref 90–180)
NON-UH HIE COMPLEMENT C4: 36 MG/DL (ref 10–40)

## 2024-06-06 DIAGNOSIS — M79.7 FIBROMYALGIA: ICD-10-CM

## 2024-06-06 RX ORDER — DULOXETIN HYDROCHLORIDE 60 MG/1
60 CAPSULE, DELAYED RELEASE ORAL 2 TIMES DAILY
Qty: 180 CAPSULE | Refills: 3 | Status: SHIPPED | OUTPATIENT
Start: 2024-06-06

## 2024-06-07 DIAGNOSIS — M32.9 SYSTEMIC LUPUS ERYTHEMATOSUS, UNSPECIFIED SLE TYPE, UNSPECIFIED ORGAN INVOLVEMENT STATUS (MULTI): Primary | ICD-10-CM

## 2024-06-07 DIAGNOSIS — M62.838 MUSCLE SPASM: ICD-10-CM

## 2024-06-07 RX ORDER — ALBUTEROL SULFATE 0.83 MG/ML
3 SOLUTION RESPIRATORY (INHALATION) AS NEEDED
OUTPATIENT
Start: 2024-06-08

## 2024-06-07 RX ORDER — ACETAMINOPHEN 325 MG/1
650 TABLET ORAL ONCE
OUTPATIENT
Start: 2024-06-08

## 2024-06-07 RX ORDER — FAMOTIDINE 10 MG/ML
20 INJECTION INTRAVENOUS ONCE AS NEEDED
OUTPATIENT
Start: 2024-06-08

## 2024-06-07 RX ORDER — EPINEPHRINE 0.3 MG/.3ML
0.3 INJECTION SUBCUTANEOUS EVERY 5 MIN PRN
OUTPATIENT
Start: 2024-06-08

## 2024-06-07 RX ORDER — ONDANSETRON 4 MG/1
4 TABLET, FILM COATED ORAL ONCE
OUTPATIENT
Start: 2024-06-08 | End: 2024-06-08

## 2024-06-07 RX ORDER — DIPHENHYDRAMINE HYDROCHLORIDE 50 MG/ML
50 INJECTION INTRAMUSCULAR; INTRAVENOUS AS NEEDED
OUTPATIENT
Start: 2024-06-08

## 2024-06-08 DIAGNOSIS — I10 ESSENTIAL HYPERTENSION: ICD-10-CM

## 2024-06-08 DIAGNOSIS — K21.9 CHRONIC GERD: ICD-10-CM

## 2024-06-13 RX ORDER — TIZANIDINE 4 MG/1
4 TABLET ORAL EVERY 6 HOURS PRN
Qty: 30 TABLET | Refills: 0 | Status: SHIPPED | OUTPATIENT
Start: 2024-06-13 | End: 2024-06-13 | Stop reason: SDUPTHER

## 2024-06-13 RX ORDER — TIZANIDINE 4 MG/1
4 TABLET ORAL EVERY 8 HOURS PRN
Qty: 90 TABLET | Refills: 0 | Status: SHIPPED | OUTPATIENT
Start: 2024-06-13 | End: 2025-06-13

## 2024-06-14 ENCOUNTER — TELEPHONE (OUTPATIENT)
Dept: RHEUMATOLOGY | Facility: CLINIC | Age: 44
End: 2024-06-14
Payer: COMMERCIAL

## 2024-06-14 NOTE — TELEPHONE ENCOUNTER
[facility] [address] [place] [address] - [address]  [address] [address] [facility] [address]  Phone: [phone] Fax: [phone]    Left message on our non urgent line.  They received rx-Tizanidine 4 mg rx yesterday.  On 5/28/24 they dispensed Cyclobenzaprine 10 mg 90 day supply.  Pharmacy asking if they should hold dispensing Tizanidine 4 mg.

## 2024-06-14 NOTE — TELEPHONE ENCOUNTER
I called [facility] pharmacy spoke to the pharmacist and read to the pharmacist Dr. Celis's message.

## 2024-06-17 DIAGNOSIS — E11.65 TYPE 2 DIABETES MELLITUS WITH HYPERGLYCEMIA, WITHOUT LONG-TERM CURRENT USE OF INSULIN (MULTI): Primary | ICD-10-CM

## 2024-06-17 RX ORDER — OMEPRAZOLE 20 MG/1
20 CAPSULE, DELAYED RELEASE ORAL DAILY
Qty: 90 CAPSULE | Refills: 0 | Status: SHIPPED | OUTPATIENT
Start: 2024-06-17

## 2024-06-17 RX ORDER — AMLODIPINE BESYLATE 5 MG/1
5 TABLET ORAL DAILY
Qty: 90 TABLET | Refills: 0 | Status: SHIPPED | OUTPATIENT
Start: 2024-06-17

## 2024-06-17 RX ORDER — SPIRONOLACTONE 25 MG/1
25 TABLET ORAL DAILY
Qty: 90 TABLET | Refills: 0 | Status: SHIPPED | OUTPATIENT
Start: 2024-06-17

## 2024-06-20 ENCOUNTER — SPECIALTY PHARMACY (OUTPATIENT)
Dept: PHARMACY | Facility: CLINIC | Age: 44
End: 2024-06-20

## 2024-06-21 ENCOUNTER — DOCUMENTATION (OUTPATIENT)
Dept: INFUSION THERAPY | Facility: CLINIC | Age: 44
End: 2024-06-21
Payer: COMMERCIAL

## 2024-06-21 NOTE — PROGRESS NOTES
Patient to be scheduled for  Continuation of Saphnelo infusions  For Diagnosis: Systemic Lupus Erythematous    No clinical clearance parameters are needed prior to Saphenlo  initiation    Last infusion received: 2023 (if continuation)    Due: NOW    This result meets treatment criteria.     Start date: anytime - insurance has approved. Patient will be called to schedule the appt in the next week.    [facility] ([place])  [facility]  [id number] [place] [id number]  Phone: [phone]  Fax: [phone]  [email or web address]

## 2024-06-26 ENCOUNTER — OFFICE VISIT (OUTPATIENT)
Dept: PRIMARY CARE | Facility: CLINIC | Age: 44
End: 2024-06-26
Payer: COMMERCIAL

## 2024-06-26 ENCOUNTER — LAB (OUTPATIENT)
Dept: LAB | Facility: LAB | Age: 44
End: 2024-06-26
Payer: COMMERCIAL

## 2024-06-26 VITALS
HEART RATE: 81 BPM | DIASTOLIC BLOOD PRESSURE: 90 MMHG | WEIGHT: 235.38 LBS | SYSTOLIC BLOOD PRESSURE: 130 MMHG | BODY MASS INDEX: 41.71 KG/M2 | HEIGHT: 63 IN

## 2024-06-26 DIAGNOSIS — R61 NIGHT SWEAT: ICD-10-CM

## 2024-06-26 DIAGNOSIS — R53.81 MALAISE: ICD-10-CM

## 2024-06-26 DIAGNOSIS — E11.65 TYPE 2 DIABETES MELLITUS WITH HYPERGLYCEMIA, WITHOUT LONG-TERM CURRENT USE OF INSULIN (MULTI): ICD-10-CM

## 2024-06-26 DIAGNOSIS — R35.0 INCREASED URINARY FREQUENCY: Primary | ICD-10-CM

## 2024-06-26 LAB
ALBUMIN SERPL BCP-MCNC: 4.4 G/DL (ref 3.4–5)
ALP SERPL-CCNC: 73 U/L (ref 33–110)
ALT SERPL W P-5'-P-CCNC: 24 U/L (ref 7–45)
ANION GAP SERPL CALC-SCNC: 14 MMOL/L (ref 10–20)
AST SERPL W P-5'-P-CCNC: 19 U/L (ref 9–39)
BASOPHILS # BLD AUTO: 0.05 X10*3/UL (ref 0–0.1)
BASOPHILS NFR BLD AUTO: 0.4 %
BILIRUB SERPL-MCNC: 0.5 MG/DL (ref 0–1.2)
BUN SERPL-MCNC: 8 MG/DL (ref 6–23)
CALCIUM SERPL-MCNC: 9.4 MG/DL (ref 8.6–10.3)
CHLORIDE SERPL-SCNC: 106 MMOL/L (ref 98–107)
CHOLEST SERPL-MCNC: 244 MG/DL (ref 0–199)
CHOLESTEROL/HDL RATIO: 5.8
CO2 SERPL-SCNC: 24 MMOL/L (ref 21–32)
CREAT SERPL-MCNC: 0.64 MG/DL (ref 0.5–1.05)
EGFRCR SERPLBLD CKD-EPI 2021: >90 ML/MIN/1.73M*2
EOSINOPHIL # BLD AUTO: 0.23 X10*3/UL (ref 0–0.7)
EOSINOPHIL NFR BLD AUTO: 1.9 %
ERYTHROCYTE [DISTWIDTH] IN BLOOD BY AUTOMATED COUNT: 13.5 % (ref 11.5–14.5)
EST. AVERAGE GLUCOSE BLD GHB EST-MCNC: 151 MG/DL
GLUCOSE SERPL-MCNC: 123 MG/DL (ref 74–99)
HBA1C MFR BLD: 6.9 %
HCT VFR BLD AUTO: 44.7 % (ref 36–46)
HDLC SERPL-MCNC: 42 MG/DL
HGB BLD-MCNC: 14.5 G/DL (ref 12–16)
IMM GRANULOCYTES # BLD AUTO: 0.05 X10*3/UL (ref 0–0.7)
IMM GRANULOCYTES NFR BLD AUTO: 0.4 % (ref 0–0.9)
LDLC SERPL CALC-MCNC: 123 MG/DL
LYMPHOCYTES # BLD AUTO: 3.33 X10*3/UL (ref 1.2–4.8)
LYMPHOCYTES NFR BLD AUTO: 27.8 %
MCH RBC QN AUTO: 28.9 PG (ref 26–34)
MCHC RBC AUTO-ENTMCNC: 32.4 G/DL (ref 32–36)
MCV RBC AUTO: 89 FL (ref 80–100)
MONOCYTES # BLD AUTO: 0.65 X10*3/UL (ref 0.1–1)
MONOCYTES NFR BLD AUTO: 5.4 %
NEUTROPHILS # BLD AUTO: 7.69 X10*3/UL (ref 1.2–7.7)
NEUTROPHILS NFR BLD AUTO: 64.1 %
NON HDL CHOLESTEROL: 202 MG/DL (ref 0–149)
NRBC BLD-RTO: 0 /100 WBCS (ref 0–0)
PLATELET # BLD AUTO: 416 X10*3/UL (ref 150–450)
POC APPEARANCE, URINE: CLEAR
POC BILIRUBIN, URINE: NEGATIVE
POC BLOOD, URINE: NEGATIVE
POC COLOR, URINE: YELLOW
POC GLUCOSE, URINE: NEGATIVE MG/DL
POC KETONES, URINE: NEGATIVE MG/DL
POC LEUKOCYTES, URINE: NEGATIVE
POC NITRITE,URINE: NEGATIVE
POC PH, URINE: 6 PH
POC PROTEIN, URINE: NEGATIVE MG/DL
POC SPECIFIC GRAVITY, URINE: 1.01
POC UROBILINOGEN, URINE: 0.2 EU/DL
POTASSIUM SERPL-SCNC: 4.8 MMOL/L (ref 3.5–5.3)
PROT SERPL-MCNC: 6.6 G/DL (ref 6.4–8.2)
RBC # BLD AUTO: 5.02 X10*6/UL (ref 4–5.2)
SODIUM SERPL-SCNC: 139 MMOL/L (ref 136–145)
TRIGL SERPL-MCNC: 397 MG/DL (ref 0–149)
TSH SERPL-ACNC: 2.84 MIU/L (ref 0.44–3.98)
VLDL: 79 MG/DL (ref 0–40)
WBC # BLD AUTO: 12 X10*3/UL (ref 4.4–11.3)

## 2024-06-26 PROCEDURE — 83036 HEMOGLOBIN GLYCOSYLATED A1C: CPT

## 2024-06-26 PROCEDURE — 80061 LIPID PANEL: CPT

## 2024-06-26 PROCEDURE — 82672 ASSAY OF ESTROGEN: CPT

## 2024-06-26 PROCEDURE — 87086 URINE CULTURE/COLONY COUNT: CPT

## 2024-06-26 PROCEDURE — 3080F DIAST BP >= 90 MM HG: CPT | Performed by: NURSE PRACTITIONER

## 2024-06-26 PROCEDURE — 3008F BODY MASS INDEX DOCD: CPT | Performed by: NURSE PRACTITIONER

## 2024-06-26 PROCEDURE — 83002 ASSAY OF GONADOTROPIN (LH): CPT

## 2024-06-26 PROCEDURE — 85025 COMPLETE CBC W/AUTO DIFF WBC: CPT

## 2024-06-26 PROCEDURE — 81003 URINALYSIS AUTO W/O SCOPE: CPT | Performed by: NURSE PRACTITIONER

## 2024-06-26 PROCEDURE — 36415 COLL VENOUS BLD VENIPUNCTURE: CPT

## 2024-06-26 PROCEDURE — 3049F LDL-C 100-129 MG/DL: CPT | Performed by: NURSE PRACTITIONER

## 2024-06-26 PROCEDURE — 83001 ASSAY OF GONADOTROPIN (FSH): CPT

## 2024-06-26 PROCEDURE — 99214 OFFICE O/P EST MOD 30 MIN: CPT | Performed by: NURSE PRACTITIONER

## 2024-06-26 PROCEDURE — 84443 ASSAY THYROID STIM HORMONE: CPT

## 2024-06-26 PROCEDURE — 3044F HG A1C LEVEL LT 7.0%: CPT | Performed by: NURSE PRACTITIONER

## 2024-06-26 PROCEDURE — 3075F SYST BP GE 130 - 139MM HG: CPT | Performed by: NURSE PRACTITIONER

## 2024-06-26 PROCEDURE — 80053 COMPREHEN METABOLIC PANEL: CPT

## 2024-06-26 ASSESSMENT — ENCOUNTER SYMPTOMS
FATIGUE: 1
NAUSEA: 1
DIZZINESS: 1
ENDOCRINE COMMENTS: NIGHT SWEATS
PSYCHIATRIC NEGATIVE: 1
VOMITING: 1
HEADACHES: 1

## 2024-06-26 NOTE — PROGRESS NOTES
"Subjective   Patient ID: Ayde Ramírez is a 44 y.o. female who presents for Night Sweats and Nausea.    HPI   Ayde returns for night sweats, and nausea, urinary frequency.    Started a couple days ago.     Took Tizandine tablet Monday night and since then has been feeling this way. She reports feeling Night sweats, nausea/dry heaving; states \"I just dont feel right.\" Feels this was a reaction to the tizanidine and her other medications. Denies any ill contacts. Denies any known fever or other symptoms.     She has also noticed she has been having an increase in urinary frequency. She denies any urinary discomfort/pain, urgency, or blood in her urine. Does have intermittent low pelvic pain but is not constant. This started around the same time.       Review of Systems   Constitutional:  Positive for fatigue.   Eyes:  Negative for visual disturbance.   Gastrointestinal:  Positive for nausea and vomiting (dry heaves).   Endocrine:        Night sweats   Neurological:  Positive for dizziness and headaches.   Psychiatric/Behavioral: Negative.         Objective   /90   Pulse 81   Ht 1.6 m (5' 3\")   Wt 107 kg (235 lb 6 oz)   LMP 02/24/2023   BMI 41.69 kg/m²     Physical Exam  Vitals and nursing note reviewed.   Constitutional:       Appearance: Normal appearance.   Cardiovascular:      Rate and Rhythm: Normal rate and regular rhythm.      Pulses: Normal pulses.      Heart sounds: Normal heart sounds.   Pulmonary:      Effort: Pulmonary effort is normal.      Breath sounds: Normal breath sounds.   Skin:     General: Skin is warm and dry.   Neurological:      General: No focal deficit present.      Mental Status: She is alert and oriented to person, place, and time.   Psychiatric:         Mood and Affect: Mood normal.         Behavior: Behavior normal.         Thought Content: Thought content normal.         Judgment: Judgment normal.         Assessment/Plan   Problem List Items Addressed This Visit  "   None  Visit Diagnoses         Codes    Increased urinary frequency    -  Primary R35.0    Relevant Orders    POCT UA Automated manually resulted (Completed)    Urine Culture (Completed)    Night sweat     R61    Relevant Orders    FSH (Completed)    Luteinizing hormone (Completed)    Estrogens, Total (Completed)    CBC and Auto Differential (Completed)    Malaise     R53.81              Follow up in August for [facility] exam.  Will get lab work and contact her with results. Return precautions discussed. She verbalized understanding.     For any new medications that were prescribed today, the patient was educated about their indications for use, administration, frequency and potential side effects of the medication.

## 2024-06-27 LAB
FSH SERPL-ACNC: 9.9 IU/L
LH SERPL-ACNC: 4.7 IU/L

## 2024-06-28 DIAGNOSIS — M32.9 SYSTEMIC LUPUS ERYTHEMATOSUS, UNSPECIFIED SLE TYPE, UNSPECIFIED ORGAN INVOLVEMENT STATUS (MULTI): Primary | ICD-10-CM

## 2024-06-28 LAB — BACTERIA UR CULT: NORMAL

## 2024-07-03 LAB — ESTROGEN SERPL-MCNC: 116 PG/ML

## 2024-07-07 DIAGNOSIS — M54.17 LUMBOSACRAL RADICULOPATHY: ICD-10-CM

## 2024-07-08 RX ORDER — GABAPENTIN 300 MG/1
300 CAPSULE ORAL
Qty: 30 CAPSULE | Refills: 0 | OUTPATIENT
Start: 2024-07-08

## 2024-07-09 DIAGNOSIS — E11.65 TYPE 2 DIABETES MELLITUS WITH HYPERGLYCEMIA, WITHOUT LONG-TERM CURRENT USE OF INSULIN (MULTI): Primary | ICD-10-CM

## 2024-07-18 ENCOUNTER — APPOINTMENT (OUTPATIENT)
Dept: GASTROENTEROLOGY | Facility: CLINIC | Age: 44
End: 2024-07-18
Payer: COMMERCIAL

## 2024-07-30 ENCOUNTER — TELEPHONE (OUTPATIENT)
Dept: OBSTETRICS AND GYNECOLOGY | Facility: CLINIC | Age: 44
End: 2024-07-30
Payer: COMMERCIAL

## 2024-07-30 DIAGNOSIS — Z30.41 ENCOUNTER FOR SURVEILLANCE OF CONTRACEPTIVE PILLS: ICD-10-CM

## 2024-07-30 RX ORDER — NORETHINDRONE 0.35 MG/1
1 TABLET ORAL DAILY
Qty: 84 TABLET | Refills: 1 | Status: SHIPPED | OUTPATIENT
Start: 2024-07-30

## 2024-08-01 ENCOUNTER — APPOINTMENT (OUTPATIENT)
Dept: GASTROENTEROLOGY | Facility: CLINIC | Age: 44
End: 2024-08-01
Payer: COMMERCIAL

## 2024-08-14 DIAGNOSIS — E03.9 HYPOTHYROIDISM, ADULT: ICD-10-CM

## 2024-08-14 RX ORDER — LEVOTHYROXINE SODIUM 25 UG/1
25 TABLET ORAL DAILY
Qty: 90 TABLET | Refills: 1 | Status: SHIPPED | OUTPATIENT
Start: 2024-08-14

## 2024-08-20 ENCOUNTER — OFFICE VISIT (OUTPATIENT)
Dept: PAIN MEDICINE | Facility: CLINIC | Age: 44
End: 2024-08-20
Payer: COMMERCIAL

## 2024-08-20 VITALS
WEIGHT: 232 LBS | SYSTOLIC BLOOD PRESSURE: 155 MMHG | BODY MASS INDEX: 41.1 KG/M2 | DIASTOLIC BLOOD PRESSURE: 83 MMHG | RESPIRATION RATE: 16 BRPM | HEART RATE: 102 BPM

## 2024-08-20 DIAGNOSIS — M47.27 OSTEOARTHRITIS OF SPINE WITH RADICULOPATHY, LUMBOSACRAL REGION: ICD-10-CM

## 2024-08-20 DIAGNOSIS — M79.7 FIBROMYALGIA: Primary | ICD-10-CM

## 2024-08-20 DIAGNOSIS — M54.17 LUMBOSACRAL RADICULOPATHY: ICD-10-CM

## 2024-08-20 PROCEDURE — 99213 OFFICE O/P EST LOW 20 MIN: CPT | Performed by: PHYSICIAN ASSISTANT

## 2024-08-20 RX ORDER — PREGABALIN 50 MG/1
50 CAPSULE ORAL 2 TIMES DAILY
Qty: 120 CAPSULE | Refills: 0 | Status: SHIPPED | OUTPATIENT
Start: 2024-08-20

## 2024-08-20 ASSESSMENT — ENCOUNTER SYMPTOMS
EYES NEGATIVE: 1
ENDOCRINE NEGATIVE: 1
NUMBNESS: 1
ARTHRALGIAS: 1
RESPIRATORY NEGATIVE: 1
PSYCHIATRIC NEGATIVE: 1
BACK PAIN: 1
CONSTITUTIONAL NEGATIVE: 1
WEAKNESS: 1
MYALGIAS: 1
HEMATOLOGIC/LYMPHATIC NEGATIVE: 1
GASTROINTESTINAL NEGATIVE: 1
CARDIOVASCULAR NEGATIVE: 1
ALLERGIC/IMMUNOLOGIC NEGATIVE: 1

## 2024-08-20 ASSESSMENT — COLUMBIA-SUICIDE SEVERITY RATING SCALE - C-SSRS
2. HAVE YOU ACTUALLY HAD ANY THOUGHTS OF KILLING YOURSELF?: NO
1. IN THE PAST MONTH, HAVE YOU WISHED YOU WERE DEAD OR WISHED YOU COULD GO TO SLEEP AND NOT WAKE UP?: NO
6. HAVE YOU EVER DONE ANYTHING, STARTED TO DO ANYTHING, OR PREPARED TO DO ANYTHING TO END YOUR LIFE?: NO

## 2024-08-20 NOTE — PROGRESS NOTES
"Subjective   Patient ID: Ayde Ramírez is a 44 y.o. female who presents for Pain (FUV for duane low back pain with radiation to duane hips and legs to and including both feet. She also states she has had pain between her shoulder blades and neck. Pain score is 5/10 today. She states she will have arm pain in the morning that feels like \"bone pain.\" And arms are sensitive to touch. Pain is described as a constant ache. When she is on her period she states the low back pain \"feels like my back is broken.\" Doing a lot of daily activities will increase or cause her pain.). Nothing helps relieve the pain. Rx for gabapentin needed. SOAPP = 7. MU = 66/100.    Ann Paul RN 08/20/24 2:09 PM     Patient is a 44-year-old female.  She presents today to further discuss her options.  She states that the injections that she has done just have not been beneficial to her and at this time, she just cannot afford to do anymore.  She is here today to discuss other options.  She was previously on Lyrica but then had issues with medication induced pancreatitis so she is not sure if the vomiting she was experiencing was from the medication or the pancreatitis.  She went from the Lyrica to the gabapentin at that time as well.  She has been using the gabapentin but it makes her so tired she cannot take it more than once at bedtime.  She is using 300 mg.  Unfortunate she has a lot of lower back pain with bilateral leg pain that she rates a 5/10.  She states that her bones just feel like they are hurt and when she is on her.  Her back feels like it is broken.        Review of Systems   Constitutional: Negative.    HENT: Negative.     Eyes: Negative.    Respiratory: Negative.     Cardiovascular: Negative.    Gastrointestinal: Negative.    Endocrine: Negative.    Genitourinary: Negative.    Musculoskeletal:  Positive for arthralgias, back pain, gait problem and myalgias.   Skin: Negative.    Allergic/Immunologic: Negative.  "   Neurological:  Positive for weakness and numbness.   Hematological: Negative.    Psychiatric/Behavioral: Negative.         Objective   Physical Exam  Vitals and nursing note reviewed.   Constitutional:       General: She is not in acute distress.     Appearance: Normal appearance. She is not ill-appearing.   HENT:      Head: Normocephalic and atraumatic.      Right Ear: External ear normal.      Left Ear: External ear normal.      Nose: Nose normal.      Mouth/Throat:      Pharynx: Oropharynx is clear.   Eyes:      Conjunctiva/sclera: Conjunctivae normal.   Cardiovascular:      Rate and Rhythm: Normal rate and regular rhythm.      Pulses: Normal pulses.   Pulmonary:      Effort: Pulmonary effort is normal.      Breath sounds: Normal breath sounds.   Musculoskeletal:         General: Normal range of motion.      Cervical back: Normal range of motion.      Comments: 5/5 strength   Skin:     General: Skin is warm and dry.   Neurological:      General: No focal deficit present.      Mental Status: She is alert and oriented to person, place, and time. Mental status is at baseline.   Psychiatric:         Mood and Affect: Mood normal.         Behavior: Behavior normal.         Thought Content: Thought content normal.         Judgment: Judgment normal.         Assessment/Plan   Diagnoses and all orders for this visit:  Fibromyalgia  -     pregabalin (Lyrica) 50 mg capsule; Take 1 capsule (50 mg) by mouth 2 times a day. 50 mg BID x 1 week. If doing ok go to 2 pills BID  Lumbosacral radiculopathy  -     pregabalin (Lyrica) 50 mg capsule; Take 1 capsule (50 mg) by mouth 2 times a day. 50 mg BID x 1 week. If doing ok go to 2 pills BID  Osteoarthritis of spine with radiculopathy, lumbosacral region  -     pregabalin (Lyrica) 50 mg capsule; Take 1 capsule (50 mg) by mouth 2 times a day. 50 mg BID x 1 week. If doing ok go to 2 pills BID       Patient is a 44-year-old female with the above-mentioned medical diagnoses following up  today to discuss her options other than injections as she states that they just do not give her relief and they are to expensive for her.  She was previously on Lyrica and she is not sure if the Lyrica was making her nauseous or other medical diagnoses.  She is currently on gabapentin but she states that this makes her tired.  She is on Cymbalta so some of her other medication options are limited.  We discussed her medication options and at this time, we are going to restart her back on Lyrica and do 50 mg twice daily and if doing okay she can go to 100 mg twice daily.  OARRS reviewed.  Medication sent.  Follow-up in 3 to 4 weeks for reevaluation and medication management.

## 2024-08-26 ENCOUNTER — APPOINTMENT (OUTPATIENT)
Dept: PRIMARY CARE | Facility: CLINIC | Age: 44
End: 2024-08-26
Payer: COMMERCIAL

## 2024-08-26 VITALS
SYSTOLIC BLOOD PRESSURE: 128 MMHG | HEIGHT: 63 IN | WEIGHT: 232.38 LBS | HEART RATE: 95 BPM | DIASTOLIC BLOOD PRESSURE: 80 MMHG | BODY MASS INDEX: 41.18 KG/M2

## 2024-08-26 DIAGNOSIS — E55.9 VITAMIN D DEFICIENCY: ICD-10-CM

## 2024-08-26 DIAGNOSIS — K21.9 CHRONIC GERD: ICD-10-CM

## 2024-08-26 DIAGNOSIS — I47.19 ATRIAL TACHYCARDIA (CMS-HCC): ICD-10-CM

## 2024-08-26 DIAGNOSIS — E66.01 CLASS 3 SEVERE OBESITY DUE TO EXCESS CALORIES WITH SERIOUS COMORBIDITY AND BODY MASS INDEX (BMI) OF 40.0 TO 44.9 IN ADULT (MULTI): ICD-10-CM

## 2024-08-26 DIAGNOSIS — E03.9 HYPOTHYROIDISM, ADULT: ICD-10-CM

## 2024-08-26 DIAGNOSIS — E78.5 MILD HYPERLIPIDEMIA: ICD-10-CM

## 2024-08-26 DIAGNOSIS — I10 ESSENTIAL HYPERTENSION: ICD-10-CM

## 2024-08-26 DIAGNOSIS — E53.8 B12 DEFICIENCY: ICD-10-CM

## 2024-08-26 DIAGNOSIS — R11.2 NAUSEA AND VOMITING, UNSPECIFIED VOMITING TYPE: ICD-10-CM

## 2024-08-26 DIAGNOSIS — E11.65 TYPE 2 DIABETES MELLITUS WITH HYPERGLYCEMIA, WITHOUT LONG-TERM CURRENT USE OF INSULIN (MULTI): ICD-10-CM

## 2024-08-26 DIAGNOSIS — Z00.00 MEDICARE ANNUAL WELLNESS VISIT, SUBSEQUENT: Primary | ICD-10-CM

## 2024-08-26 PROCEDURE — 1123F ACP DISCUSS/DSCN MKR DOCD: CPT | Performed by: NURSE PRACTITIONER

## 2024-08-26 PROCEDURE — 3008F BODY MASS INDEX DOCD: CPT | Performed by: NURSE PRACTITIONER

## 2024-08-26 PROCEDURE — 3044F HG A1C LEVEL LT 7.0%: CPT | Performed by: NURSE PRACTITIONER

## 2024-08-26 PROCEDURE — 3079F DIAST BP 80-89 MM HG: CPT | Performed by: NURSE PRACTITIONER

## 2024-08-26 PROCEDURE — 3074F SYST BP LT 130 MM HG: CPT | Performed by: NURSE PRACTITIONER

## 2024-08-26 PROCEDURE — G0444 DEPRESSION SCREEN ANNUAL: HCPCS | Performed by: NURSE PRACTITIONER

## 2024-08-26 PROCEDURE — 4004F PT TOBACCO SCREEN RCVD TLK: CPT | Performed by: NURSE PRACTITIONER

## 2024-08-26 PROCEDURE — G0439 PPPS, SUBSEQ VISIT: HCPCS | Performed by: NURSE PRACTITIONER

## 2024-08-26 PROCEDURE — 3049F LDL-C 100-129 MG/DL: CPT | Performed by: NURSE PRACTITIONER

## 2024-08-26 PROCEDURE — 99214 OFFICE O/P EST MOD 30 MIN: CPT | Performed by: NURSE PRACTITIONER

## 2024-08-26 RX ORDER — ONDANSETRON 8 MG/1
8 TABLET, ORALLY DISINTEGRATING ORAL EVERY 8 HOURS PRN
Qty: 20 TABLET | Refills: 1 | Status: SHIPPED | OUTPATIENT
Start: 2024-08-26

## 2024-08-26 RX ORDER — LANOLIN ALCOHOL/MO/W.PET/CERES
1000 CREAM (GRAM) TOPICAL DAILY
Qty: 90 TABLET | Refills: 1 | Status: SHIPPED | OUTPATIENT
Start: 2024-08-26

## 2024-08-26 RX ORDER — AMLODIPINE BESYLATE 5 MG/1
5 TABLET ORAL DAILY
Qty: 90 TABLET | Refills: 1 | Status: SHIPPED | OUTPATIENT
Start: 2024-08-26

## 2024-08-26 RX ORDER — CHOLECALCIFEROL (VITAMIN D3) 25 MCG
1000 TABLET ORAL DAILY
Qty: 90 TABLET | Refills: 1 | Status: SHIPPED | OUTPATIENT
Start: 2024-08-26

## 2024-08-26 RX ORDER — DAPAGLIFLOZIN 5 MG/1
5 TABLET, FILM COATED ORAL DAILY
Qty: 90 TABLET | Refills: 1 | Status: SHIPPED | OUTPATIENT
Start: 2024-08-26

## 2024-08-26 RX ORDER — ATORVASTATIN CALCIUM 20 MG/1
20 TABLET, FILM COATED ORAL NIGHTLY
Qty: 90 TABLET | Refills: 1 | Status: SHIPPED | OUTPATIENT
Start: 2024-08-26

## 2024-08-26 RX ORDER — METFORMIN HYDROCHLORIDE 500 MG/1
1000 TABLET, EXTENDED RELEASE ORAL
Qty: 360 TABLET | Refills: 1 | Status: SHIPPED | OUTPATIENT
Start: 2024-08-26

## 2024-08-26 RX ORDER — SPIRONOLACTONE 25 MG/1
25 TABLET ORAL DAILY
Qty: 90 TABLET | Refills: 1 | Status: SHIPPED | OUTPATIENT
Start: 2024-08-26

## 2024-08-26 RX ORDER — OMEPRAZOLE 20 MG/1
20 CAPSULE, DELAYED RELEASE ORAL DAILY
Qty: 90 CAPSULE | Refills: 1 | Status: SHIPPED | OUTPATIENT
Start: 2024-08-26

## 2024-08-26 RX ORDER — LEVOTHYROXINE SODIUM 25 UG/1
25 TABLET ORAL DAILY
Qty: 90 TABLET | Refills: 1 | Status: SHIPPED | OUTPATIENT
Start: 2024-08-26

## 2024-08-26 RX ORDER — ATENOLOL 50 MG/1
TABLET ORAL
Qty: 270 TABLET | Refills: 1 | Status: SHIPPED | OUTPATIENT
Start: 2024-08-26

## 2024-08-26 ASSESSMENT — ENCOUNTER SYMPTOMS
LOSS OF SENSATION IN FEET: 0
VOMITING: 1
CARDIOVASCULAR NEGATIVE: 1
NERVOUS/ANXIOUS: 0
DYSPHORIC MOOD: 1
HEADACHES: 0
OCCASIONAL FEELINGS OF UNSTEADINESS: 0
DIARRHEA: 0
NAUSEA: 1
ENDOCRINE NEGATIVE: 1
MYALGIAS: 1
CONSTIPATION: 0
FEVER: 0
WEAKNESS: 1
RESPIRATORY NEGATIVE: 1
DEPRESSION: 0
ARTHRALGIAS: 1
SLEEP DISTURBANCE: 0
LIGHT-HEADEDNESS: 1
DIZZINESS: 0
FATIGUE: 1

## 2024-08-26 ASSESSMENT — ACTIVITIES OF DAILY LIVING (ADL)
DOING_HOUSEWORK: INDEPENDENT
BATHING: INDEPENDENT
MANAGING_FINANCES: INDEPENDENT
TAKING_MEDICATION: INDEPENDENT
GROCERY_SHOPPING: INDEPENDENT

## 2024-08-26 ASSESSMENT — PATIENT HEALTH QUESTIONNAIRE - PHQ9
2. FEELING DOWN, DEPRESSED OR HOPELESS: SEVERAL DAYS
1. LITTLE INTEREST OR PLEASURE IN DOING THINGS: SEVERAL DAYS
SUM OF ALL RESPONSES TO PHQ9 QUESTIONS 1 AND 2: 2

## 2024-08-26 NOTE — PROGRESS NOTES
"Subjective   Reason for Visit: Ayde Ramírez is an 44 y.o. female here for a Medicare Wellness visit.     Past Medical, Surgical, and Family History reviewed and updated in chart.    Reviewed all medications by prescribing practitioner or clinical pharmacist (such as prescriptions, OTCs, herbal therapies and supplements) and documented in the medical record.    CONSTANCE Messer returns for W exam.    DM: Farxiga, Metformin, Rybelsus,. A1c controlled 6.9%, will recheck again in December. Denies any hypo/hyperglycemic episodes.     Hypothyroid: currently on levothyroxine. Stable.    HTN: controlled on CCB, diuretic.    Lupus: sees Rheumatology      Mammogram: sees Dr. Garcia  Pap: sees GYN  Colon CA screen: start age 47    Patient Care Team:  DILIA Neves as PCP - General (Family Medicine)  DILIA Sanderson as Nurse Practitioner (Hematology and Oncology)  Karen Celis MD as Referring Physician (Rheumatology)     Review of Systems   Constitutional:  Positive for fatigue. Negative for fever.   HENT: Negative.     Respiratory: Negative.     Cardiovascular: Negative.    Gastrointestinal:  Positive for nausea and vomiting. Negative for constipation and diarrhea.   Endocrine: Negative.    Genitourinary: Negative.    Musculoskeletal:  Positive for arthralgias, gait problem and myalgias.   Neurological:  Positive for weakness and light-headedness. Negative for dizziness and headaches.   Psychiatric/Behavioral:  Positive for dysphoric mood. Negative for self-injury, sleep disturbance and suicidal ideas. The patient is not nervous/anxious.        Objective   Vitals:  /80   Pulse 95   Ht 1.6 m (5' 3\")   Wt 105 kg (232 lb 6 oz)   LMP 02/24/2023   BMI 41.16 kg/m²       Physical Exam  Vitals and nursing note reviewed.   Constitutional:       Appearance: Normal appearance.   HENT:      Head: Normocephalic and atraumatic.   Cardiovascular:      Rate and Rhythm: Normal rate and regular rhythm.      " Pulses: Normal pulses.      Heart sounds: Normal heart sounds.   Pulmonary:      Effort: Pulmonary effort is normal.      Breath sounds: Normal breath sounds.   Abdominal:      General: Bowel sounds are normal.      Palpations: Abdomen is soft.   Skin:     General: Skin is warm and dry.   Neurological:      General: No focal deficit present.      Mental Status: She is alert and oriented to person, place, and time.   Psychiatric:         Mood and Affect: Mood normal.         Behavior: Behavior normal.         Thought Content: Thought content normal.         Judgment: Judgment normal.         Assessment/Plan   Problem List Items Addressed This Visit             ICD-10-CM    Chronic GERD K21.9     Omeprazole 20 mg daily - refilled         Relevant Medications    omeprazole (PriLOSEC) 20 mg DR capsule    Essential hypertension I10     Amlodipine 5 mg daily  Spironolactone 25mg daily         Relevant Medications    spironolactone (Aldactone) 25 mg tablet    amLODIPine (Norvasc) 5 mg tablet    Vitamin D deficiency E55.9    Relevant Medications    cholecalciferol (Vitamin D-3) 25 MCG (1000 UT) tablet    Other Relevant Orders    Vitamin D 25-Hydroxy,Total (for eval of Vitamin D levels)    Mild hyperlipidemia E78.5    Relevant Medications    atorvastatin (Lipitor) 20 mg tablet    Other Relevant Orders    Comprehensive Metabolic Panel    Lipid Panel    Atrial tachycardia (CMS-HCC) I47.19     Stable.   Not tachycardic today.     Atenolol 50 mg daily - refilled         Relevant Medications    atenolol (Tenormin) 50 mg tablet    amLODIPine (Norvasc) 5 mg tablet    Hypothyroidism, adult E03.9     Levothyroxine 25 mg daily - refilled         Relevant Medications    levothyroxine (Synthroid, Levoxyl) 25 mcg tablet    Type 2 diabetes mellitus with hyperglycemia, without long-term current use of insulin (Multi) E11.65     Rybelsus 7 mg daily  Farxiga 5 mg daily  Metformin 1000 mg BID    6/26/24: A1c 6.9%           Relevant  Medications    semaglutide (Rybelsus) 7 mg tablet    metFORMIN  mg 24 hr tablet    Farxiga 5 mg    Other Relevant Orders    Hemoglobin A1C    Class 3 severe obesity due to excess calories with serious comorbidity and body mass index (BMI) of 40.0 to 44.9 in adult (Multi) E66.01, Z68.41    Nausea and vomiting R11.2    Relevant Medications    ondansetron ODT (Zofran-ODT) 8 mg disintegrating tablet     Other Visit Diagnoses         Codes    Medicare annual wellness visit, subsequent    -  Primary Z00.00    B12 deficiency     E53.8    Relevant Medications    cyanocobalamin (Vitamin B-12) 1,000 mcg tablet    Other Relevant Orders    Vitamin B12              Depression Screening  5 - 10 minutes were spent screening for depression.       Follow up in 6 months for chronic care. Labs in December. We will contact her with lab results.

## 2024-09-04 ENCOUNTER — APPOINTMENT (OUTPATIENT)
Dept: PAIN MEDICINE | Facility: CLINIC | Age: 44
End: 2024-09-04
Payer: COMMERCIAL

## 2024-10-09 ENCOUNTER — APPOINTMENT (OUTPATIENT)
Dept: OBSTETRICS AND GYNECOLOGY | Facility: CLINIC | Age: 44
End: 2024-10-09
Payer: COMMERCIAL

## 2024-10-15 PROBLEM — D50.9 IRON DEFICIENCY ANEMIA: Status: RESOLVED | Noted: 2023-08-24 | Resolved: 2024-10-15

## 2024-10-17 ENCOUNTER — APPOINTMENT (OUTPATIENT)
Dept: RHEUMATOLOGY | Facility: CLINIC | Age: 44
End: 2024-10-17
Payer: COMMERCIAL

## 2024-10-17 DIAGNOSIS — M32.9 SYSTEMIC LUPUS ERYTHEMATOSUS, UNSPECIFIED SLE TYPE, UNSPECIFIED ORGAN INVOLVEMENT STATUS (MULTI): Primary | ICD-10-CM

## 2024-10-17 DIAGNOSIS — Z79.899 IMMUNOSUPPRESSION DUE TO DRUG THERAPY: ICD-10-CM

## 2024-10-17 DIAGNOSIS — D84.821 IMMUNOSUPPRESSION DUE TO DRUG THERAPY: ICD-10-CM

## 2024-10-17 PROCEDURE — 3049F LDL-C 100-129 MG/DL: CPT | Performed by: INTERNAL MEDICINE

## 2024-10-17 PROCEDURE — 99213 OFFICE O/P EST LOW 20 MIN: CPT | Performed by: INTERNAL MEDICINE

## 2024-10-17 PROCEDURE — 4004F PT TOBACCO SCREEN RCVD TLK: CPT | Performed by: INTERNAL MEDICINE

## 2024-10-17 PROCEDURE — 3044F HG A1C LEVEL LT 7.0%: CPT | Performed by: INTERNAL MEDICINE

## 2024-10-17 RX ORDER — PREDNISONE 10 MG/1
10 TABLET ORAL DAILY
Qty: 90 TABLET | Refills: 3 | Status: SHIPPED | OUTPATIENT
Start: 2024-10-17 | End: 2025-10-17

## 2024-10-17 RX ORDER — MYCOPHENOLATE MOFETIL 500 MG/1
500 TABLET ORAL 2 TIMES DAILY
Qty: 180 TABLET | Refills: 3 | Status: SHIPPED | OUTPATIENT
Start: 2024-10-17 | End: 2025-10-17

## 2024-10-17 ASSESSMENT — ENCOUNTER SYMPTOMS
MYALGIAS: 0
COUGH: 0
BACK PAIN: 1
FATIGUE: 0
JOINT SWELLING: 0
WEAKNESS: 0
APPETITE CHANGE: 1
NUMBNESS: 0
SHORTNESS OF BREATH: 0
ARTHRALGIAS: 1
COLOR CHANGE: 0
FEVER: 0

## 2024-10-17 ASSESSMENT — PATIENT HEALTH QUESTIONNAIRE - PHQ9
2. FEELING DOWN, DEPRESSED OR HOPELESS: NOT AT ALL
SUM OF ALL RESPONSES TO PHQ9 QUESTIONS 1 & 2: 0
1. LITTLE INTEREST OR PLEASURE IN DOING THINGS: NOT AT ALL

## 2024-10-17 NOTE — PROGRESS NOTES
[facility] RHEUMATOLOGY     AND INTERNAL MEDICINE    RHEUMATOLOGY PROGRESS NOTE  Ayde Ramírez 44 y.o. female  Chief Complaint   Patient presents with    Follow-up    Lupus     Virtual or Telephone Consent    An interactive audio and video telecommunication system which permits real time communications between the patient (at the originating site) and provider (at the distant site) was utilized to provide this telehealth service.   Verbal consent was requested and obtained from Ayde Ramírez on this date, 10/17/24 for a telehealth visit.     SUBJECTIVE  Visit converted to virtual because pt home with a sick child (fever and fatigue).  Last week, pt had stomach bug and is slowly recovering but still doesn't have appetite.    Other than her back, pt feels symptoms are stable.    Feels current meds are working at this point      Records since last seen reviewed in [facility], [facility] and [facility] Record  Patient Active Problem List    Diagnosis Date Noted    Nausea and vomiting 08/26/2024    Immunosuppression due to drug therapy 06/01/2024    Atrial tachycardia (CMS-HCC) 08/24/2023    Chronic diarrhea 08/24/2023    Fibromyalgia 08/24/2023    Hypothyroidism, adult 08/24/2023    Iron deficiency 08/24/2023    Leukocytosis 08/24/2023    Lumbosacral radiculopathy 08/24/2023    Lumbosacral spondylosis 08/24/2023    Migraine without aura and without status migrainosus, not intractable 08/24/2023    TAZ on CPAP 08/24/2023    Moderate episode of recurrent major depressive disorder 08/24/2023    Systemic lupus erythematosus (Multi) 08/24/2023    Class 3 severe obesity due to excess calories with serious comorbidity and body mass index (BMI) of 40.0 to 44.9 in adult 08/24/2023    Chronic GERD 04/11/2023    Essential hypertension 04/11/2023    Vitamin D deficiency 04/11/2023    Mild hyperlipidemia 04/11/2023    Type 2 diabetes mellitus with hyperglycemia, without  long-term current use of insulin 05/17/2022    Hidradenitis suppurativa 03/09/2022     Past Medical History:   Diagnosis Date    Amenorrhea 12/13/2019    History of amenorrhea    Caffeine use     Chronic pancreatitis (Multi) 08/24/2023    Chronic sinusitis 08/24/2023    COVID-19 vaccine series declined     Disease of thyroid gland     Other hypersomnia 01/07/2021    Excessive daytime sleepiness    Personal history of urinary calculi 08/01/2018    History of renal calculi    Secondary pancreatic insufficiency ([facility]) 08/24/2023     Current Outpatient Medications on File Prior to Visit   Medication Sig Dispense Refill    amLODIPine (Norvasc) 5 mg tablet Take 1 tablet (5 mg) by mouth once daily. 90 tablet 1    atenolol (Tenormin) 50 mg tablet Take 2 tablets in am and 1 tablet in the pm. 270 tablet 1    atorvastatin (Lipitor) 20 mg tablet Take 1 tablet (20 mg) by mouth once daily at bedtime. 90 tablet 1    blood sugar diagnostic (Blood Glucose Test) strip TEST FOUR TIMES A DAY.      cholecalciferol (Vitamin D-3) 25 MCG (1000 UT) tablet Take 1 tablet (1,000 Units) by mouth once daily. 90 tablet 1    colestipol (Colestid) 1 gram tablet Take 1 tablet (1 g) by mouth 2 times a day after meals. Take at least 1 hour after or 4 hours before other medications. 60 tablet 5    cyanocobalamin (Vitamin B-12) 1,000 mcg tablet Take 1 tablet (1,000 mcg) by mouth once daily. 90 tablet 1    diclofenac sodium 1 % kit Apply topically 4 times a day as needed.      DULoxetine (Cymbalta) 60 mg DR capsule Take 1 capsule by mouth twice daily 180 capsule 3    Farxiga 5 mg Take 1 tablet (5 mg) by mouth once daily. 90 tablet 1    folic acid (Folvite) 1 mg tablet Take 1 tablet (1 mg) by mouth once daily.      levothyroxine (Synthroid, Levoxyl) 25 mcg tablet Take 1 tablet (25 mcg) by mouth once daily. 90 tablet 1    lipase-protease-amylase (Zenpep) 40,000-126,000- 168,000 unit capsule,delayed release(DR/EC) Take by mouth.      magnesium chloride  (MagDelay) 64 mg EC tablet Take by mouth.      metFORMIN  mg 24 hr tablet Take 2 tablets (1,000 mg) by mouth 2 times daily (morning and late afternoon). 360 tablet 1    norethindrone (Jencycla) 0.35 mg tablet Take 1 tablet (0.35 mg) by mouth once daily. 84 tablet 1    omeprazole (PriLOSEC) 20 mg DR capsule Take 1 capsule (20 mg) by mouth once daily. 90 capsule 1    ondansetron ODT (Zofran-ODT) 8 mg disintegrating tablet Take 1 tablet (8 mg) by mouth every 8 hours if needed for nausea or vomiting. 20 tablet 1    pregabalin (Lyrica) 50 mg capsule Take 1 capsule (50 mg) by mouth 2 times a day. 50 mg BID x 1 week. If doing ok go to 2 pills  capsule 0    semaglutide (Rybelsus) 7 mg tablet Take 1 tablet (7 mg) by mouth once daily. 90 tablet 1    simethicone (Gas Relief, simethicone,) 80 mg chewable tablet Chew 1 tablet (80 mg) every 6 hours if needed for flatulence. 30 tablet 1    spironolactone (Aldactone) 25 mg tablet Take 1 tablet (25 mg) by mouth once daily. 90 tablet 1    [DISCONTINUED] mycophenolate (Cellcept) 500 mg tablet Take by mouth.      [DISCONTINUED] predniSONE (Deltasone) 10 mg tablet Take 1 tablet (10 mg) by mouth once daily.      [DISCONTINUED] anifrolumab-fnia (Saphnelo) Infuse 2 mL (300 mg total) into a venous catheter every 28 (twenty-eight) days. (Patient not taking: Reported on 8/20/2024) 2 mL 3     No current facility-administered medications on file prior to visit.     Allergies   Allergen Reactions    Adhesive Tape-Silicones Rash     Social History     Tobacco Use    Smoking status: Every Day     Current packs/day: 0.50     Average packs/day: 0.5 packs/day for 20.0 years (10.0 ttl pk-yrs)     Types: Cigarettes    Smokeless tobacco: Never   Vaping Use    Vaping status: Never Used   Substance Use Topics    Alcohol use: Never    Drug use: Never     Review of Systems   Constitutional:  Positive for appetite change (has no appetite). Negative for fatigue and fever.   Respiratory:  Negative  for cough and shortness of breath.    Cardiovascular:  Negative for leg swelling.   Musculoskeletal:  Positive for arthralgias and back pain. Negative for gait problem, joint swelling and myalgias.   Skin:  Negative for color change and rash.   Neurological:  Negative for weakness and numbness.       PHYSICAL EXAM  LMP 02/24/2023   Depression: Not at risk (10/17/2024)    PHQ-2     PHQ-2 Score: 0     Physical Exam  Vitals reviewed.   Constitutional:       General: She is not in acute distress.     Appearance: Normal appearance. She is not ill-appearing.   HENT:      Head: Normocephalic and atraumatic.   Eyes:      Extraocular Movements: Extraocular movements intact.      Conjunctiva/sclera: Conjunctivae normal.      Pupils: Pupils are equal, round, and reactive to light.   Pulmonary:      Effort: Pulmonary effort is normal. No respiratory distress.   Musculoskeletal:      Cervical back: Normal range of motion.      Comments: No visible abnormalities noted   Skin:     Findings: No rash.   Neurological:      General: No focal deficit present.      Mental Status: She is alert and oriented to person, place, and time. Mental status is at baseline.   Psychiatric:         Mood and Affect: Mood normal.       Health Maintenance Due   Topic Date Due    HIV Screening  Never done    Diabetes: Retinopathy Screening  Never done    Hepatitis B Vaccines (1 of 3 - 19+ 3-dose series) Never done    Zoster Vaccines (1 of 2) Never done    DTaP/Tdap/Td Vaccines (1 - Tdap) 09/16/2004    Mammogram  Never done    Colorectal Cancer Screening  02/18/2024    Diabetes: Urine Protein Screening  03/10/2024    Influenza Vaccine (1) Never done    Diabetes: Hemoglobin A1C  09/26/2024       Assessment/plan  Assessment & Plan  Systemic lupus erythematosus, unspecified SLE type, unspecified organ involvement status (Multi)  Largely stable on cellcept and low dose prednisone.  Pt to continue meds  Labs ordered today to monitor for increased disease  activity, end organ manifestations and to monitor for any drug toxicities due to chronic medications    Orders:    predniSONE (Deltasone) 10 mg tablet; Take 1 tablet (10 mg) by mouth once daily.    mycophenolate (Cellcept) 500 mg tablet; Take 1 tablet (500 mg) by mouth 2 times a day.    Anti-DNA Antibody, Double-Stranded; Future    C3 Complement; Future    C4 Complement; Future    CBC and Auto Differential; Future    Comprehensive Metabolic Panel; Future    C-Reactive Protein; Future    Sedimentation Rate; Future    Immunosuppression due to drug therapy           Follow up: __4___months    Immunization History   Administered Date(s) Administered    MMR vaccine, subcutaneous (MMR II) 04/12/1995    Td (adult), unspecified 09/15/2004

## 2024-10-17 NOTE — ASSESSMENT & PLAN NOTE
Largely stable on cellcept and low dose prednisone.  Pt to continue meds  Labs ordered today to monitor for increased disease activity, end organ manifestations and to monitor for any drug toxicities due to chronic medications    Orders:    predniSONE (Deltasone) 10 mg tablet; Take 1 tablet (10 mg) by mouth once daily.    mycophenolate (Cellcept) 500 mg tablet; Take 1 tablet (500 mg) by mouth 2 times a day.    Anti-DNA Antibody, Double-Stranded; Future    C3 Complement; Future    C4 Complement; Future    CBC and Auto Differential; Future    Comprehensive Metabolic Panel; Future    C-Reactive Protein; Future    Sedimentation Rate; Future

## 2024-10-17 NOTE — PATIENT INSTRUCTIONS

## 2024-11-05 ENCOUNTER — LAB (OUTPATIENT)
Dept: LAB | Facility: LAB | Age: 44
End: 2024-11-05
Payer: COMMERCIAL

## 2024-11-05 DIAGNOSIS — M32.9 SYSTEMIC LUPUS ERYTHEMATOSUS, UNSPECIFIED SLE TYPE, UNSPECIFIED ORGAN INVOLVEMENT STATUS (MULTI): ICD-10-CM

## 2024-11-05 LAB
ALBUMIN SERPL BCP-MCNC: 4.2 G/DL (ref 3.4–5)
ALP SERPL-CCNC: 90 U/L (ref 33–110)
ALT SERPL W P-5'-P-CCNC: 24 U/L (ref 7–45)
ANION GAP SERPL CALC-SCNC: 13 MMOL/L (ref 10–20)
AST SERPL W P-5'-P-CCNC: 18 U/L (ref 9–39)
BASOPHILS # BLD AUTO: 0.06 X10*3/UL (ref 0–0.1)
BASOPHILS NFR BLD AUTO: 0.5 %
BILIRUB SERPL-MCNC: 0.4 MG/DL (ref 0–1.2)
BUN SERPL-MCNC: 6 MG/DL (ref 6–23)
CALCIUM SERPL-MCNC: 9.4 MG/DL (ref 8.6–10.3)
CHLORIDE SERPL-SCNC: 105 MMOL/L (ref 98–107)
CO2 SERPL-SCNC: 24 MMOL/L (ref 21–32)
CREAT SERPL-MCNC: 0.71 MG/DL (ref 0.5–1.05)
CRP SERPL-MCNC: 0.81 MG/DL
EGFRCR SERPLBLD CKD-EPI 2021: >90 ML/MIN/1.73M*2
EOSINOPHIL # BLD AUTO: 0.31 X10*3/UL (ref 0–0.7)
EOSINOPHIL NFR BLD AUTO: 2.6 %
ERYTHROCYTE [DISTWIDTH] IN BLOOD BY AUTOMATED COUNT: 13.3 % (ref 11.5–14.5)
ERYTHROCYTE [SEDIMENTATION RATE] IN BLOOD BY WESTERGREN METHOD: 18 MM/H (ref 0–20)
GLUCOSE SERPL-MCNC: 124 MG/DL (ref 74–99)
HCT VFR BLD AUTO: 46.2 % (ref 36–46)
HGB BLD-MCNC: 14.6 G/DL (ref 12–16)
IMM GRANULOCYTES # BLD AUTO: 0.03 X10*3/UL (ref 0–0.7)
IMM GRANULOCYTES NFR BLD AUTO: 0.3 % (ref 0–0.9)
LYMPHOCYTES # BLD AUTO: 3.1 X10*3/UL (ref 1.2–4.8)
LYMPHOCYTES NFR BLD AUTO: 26.1 %
MCH RBC QN AUTO: 28.8 PG (ref 26–34)
MCHC RBC AUTO-ENTMCNC: 31.6 G/DL (ref 32–36)
MCV RBC AUTO: 91 FL (ref 80–100)
MONOCYTES # BLD AUTO: 0.62 X10*3/UL (ref 0.1–1)
MONOCYTES NFR BLD AUTO: 5.2 %
NEUTROPHILS # BLD AUTO: 7.77 X10*3/UL (ref 1.2–7.7)
NEUTROPHILS NFR BLD AUTO: 65.3 %
NRBC BLD-RTO: 0 /100 WBCS (ref 0–0)
PLATELET # BLD AUTO: 359 X10*3/UL (ref 150–450)
POTASSIUM SERPL-SCNC: 4.1 MMOL/L (ref 3.5–5.3)
PROT SERPL-MCNC: 6.5 G/DL (ref 6.4–8.2)
RBC # BLD AUTO: 5.07 X10*6/UL (ref 4–5.2)
SODIUM SERPL-SCNC: 138 MMOL/L (ref 136–145)
WBC # BLD AUTO: 11.9 X10*3/UL (ref 4.4–11.3)

## 2024-11-05 PROCEDURE — 86140 C-REACTIVE PROTEIN: CPT

## 2024-11-05 PROCEDURE — 36415 COLL VENOUS BLD VENIPUNCTURE: CPT

## 2024-11-05 PROCEDURE — 86225 DNA ANTIBODY NATIVE: CPT

## 2024-11-05 PROCEDURE — 85025 COMPLETE CBC W/AUTO DIFF WBC: CPT

## 2024-11-05 PROCEDURE — 86160 COMPLEMENT ANTIGEN: CPT

## 2024-11-05 PROCEDURE — 85652 RBC SED RATE AUTOMATED: CPT

## 2024-11-05 PROCEDURE — 80053 COMPREHEN METABOLIC PANEL: CPT

## 2024-11-06 LAB
C3 SERPL-MCNC: 170 MG/DL (ref 87–200)
C4 SERPL-MCNC: 46 MG/DL (ref 10–50)
DSDNA AB SER-ACNC: <1 IU/ML

## 2024-11-18 DIAGNOSIS — R11.2 NAUSEA AND VOMITING, UNSPECIFIED VOMITING TYPE: ICD-10-CM

## 2024-11-18 DIAGNOSIS — M62.838 MUSCLE SPASM: ICD-10-CM

## 2024-11-18 RX ORDER — TIZANIDINE 4 MG/1
4 TABLET ORAL EVERY 8 HOURS PRN
Qty: 90 TABLET | Refills: 3 | Status: SHIPPED | OUTPATIENT
Start: 2024-11-18 | End: 2025-11-18

## 2024-11-18 RX ORDER — ONDANSETRON 8 MG/1
8 TABLET, ORALLY DISINTEGRATING ORAL EVERY 8 HOURS PRN
Qty: 20 TABLET | Refills: 1 | Status: SHIPPED | OUTPATIENT
Start: 2024-11-18

## 2024-11-19 ENCOUNTER — OFFICE VISIT (OUTPATIENT)
Dept: PAIN MEDICINE | Facility: CLINIC | Age: 44
End: 2024-11-19
Payer: COMMERCIAL

## 2024-11-19 VITALS
DIASTOLIC BLOOD PRESSURE: 88 MMHG | SYSTOLIC BLOOD PRESSURE: 137 MMHG | BODY MASS INDEX: 41.1 KG/M2 | WEIGHT: 232 LBS | HEART RATE: 105 BPM | RESPIRATION RATE: 18 BRPM

## 2024-11-19 DIAGNOSIS — M54.17 LUMBOSACRAL RADICULOPATHY: ICD-10-CM

## 2024-11-19 DIAGNOSIS — M79.7 FIBROMYALGIA: ICD-10-CM

## 2024-11-19 DIAGNOSIS — M47.27 OSTEOARTHRITIS OF SPINE WITH RADICULOPATHY, LUMBOSACRAL REGION: ICD-10-CM

## 2024-11-19 DIAGNOSIS — M32.9 SYSTEMIC LUPUS ERYTHEMATOSUS, UNSPECIFIED SLE TYPE, UNSPECIFIED ORGAN INVOLVEMENT STATUS (MULTI): Primary | ICD-10-CM

## 2024-11-19 PROCEDURE — 99213 OFFICE O/P EST LOW 20 MIN: CPT

## 2024-11-19 RX ORDER — PREGABALIN 150 MG/1
150 CAPSULE ORAL 2 TIMES DAILY
Qty: 60 CAPSULE | Refills: 2 | Status: SHIPPED | OUTPATIENT
Start: 2024-11-19

## 2024-11-19 RX ORDER — AMITRIPTYLINE HYDROCHLORIDE 10 MG/1
10 TABLET, FILM COATED ORAL NIGHTLY
Qty: 30 TABLET | Refills: 2 | Status: SHIPPED | OUTPATIENT
Start: 2024-11-19 | End: 2025-02-17

## 2024-11-19 ASSESSMENT — ENCOUNTER SYMPTOMS
ADENOPATHY: 0
FACIAL ASYMMETRY: 0
ALLERGIC/IMMUNOLOGIC NEGATIVE: 1
SHORTNESS OF BREATH: 0
DYSPHORIC MOOD: 0
WHEEZING: 0
BRUISES/BLEEDS EASILY: 0
COUGH: 0
BACK PAIN: 1
ARTHRALGIAS: 0
EYES NEGATIVE: 1
LIGHT-HEADEDNESS: 0
PALPITATIONS: 0
ENDOCRINE NEGATIVE: 1
WEAKNESS: 0
MYALGIAS: 1
CONSTITUTIONAL NEGATIVE: 1

## 2024-11-19 NOTE — PROGRESS NOTES
Subjective   Patient ID: Ayde Ramírez is a 44 y.o. female who presents for Med Refill (FUV meds. Today reports having pain in her bilat lower back  into bilat hips and sown bilat legs and feet rates pain 7/10 now and 10/10 at worst, describes as constant sharp and stabbing and is increased with activity. She is taking Lyrica, Cymbalta, Tylenol, and prednisone daily she reports this is not really help her pain.)  She will need a RF on Lyrica send to [facility], she would like discuss if Lyrica can be increased   MU score 68%.   Albina Sampson RN 11/19/24 10:46 AM     The patient is a 44-year-old female presents today for 3-month follow-up appointment for medication management.  She currently rates pain 7/10, but says it can get up to 10/10 at its worst across her lower back into bilateral hips and down her legs to her feet.  She also has been having fibro flareups where she has a generalized full body pain.  At her most recent appointment she was continued on pregabalin 100 mg twice a day.  She denies side effects to this medication, but says she does not notice much benefit from it.  She also gets Cymbalta 60 mg twice a day from her rheumatologist and uses prednisone 10 mg daily and Tylenol for pain relief.  She is wondering what else we have to offer to help with her pain.  She has had injections in the past, and says they did not bring any long-term relief, and with how expensive they are for her, she is not interested in pursuing further injections at this time.        Review of Systems   Constitutional: Negative.    HENT: Negative.     Eyes: Negative.    Respiratory:  Negative for cough, shortness of breath and wheezing.    Cardiovascular:  Negative for chest pain, palpitations and leg swelling.   Endocrine: Negative.    Genitourinary: Negative.    Musculoskeletal:  Positive for back pain and myalgias. Negative for arthralgias.   Skin: Negative.    Allergic/Immunologic: Negative.    Neurological:  Negative for  facial asymmetry, weakness and light-headedness.   Hematological:  Negative for adenopathy. Does not bruise/bleed easily.   Psychiatric/Behavioral:  Negative for dysphoric mood and suicidal ideas.        Objective   Physical Exam  Constitutional:       General: She is not in acute distress.     Appearance: Normal appearance.   HENT:      Head: Normocephalic.      Mouth/Throat:      Mouth: Mucous membranes are moist.   Eyes:      Extraocular Movements: Extraocular movements intact.   Cardiovascular:      Rate and Rhythm: Normal rate and regular rhythm.      Pulses: Normal pulses.      Heart sounds: Normal heart sounds. No murmur heard.     No friction rub. No gallop.   Pulmonary:      Effort: Pulmonary effort is normal.      Breath sounds: Normal breath sounds. No wheezing, rhonchi or rales.   Abdominal:      General: Abdomen is flat.      Palpations: Abdomen is soft.   Musculoskeletal:      Cervical back: Normal range of motion.      Right lower leg: No edema.      Left lower leg: No edema.      Comments: Ambulates without assistance  Strength 5/5 BLE   Lymphadenopathy:      Cervical: No cervical adenopathy.   Skin:     General: Skin is warm and dry.   Neurological:      General: No focal deficit present.      Mental Status: She is alert and oriented to person, place, and time. Mental status is at baseline.   Psychiatric:         Mood and Affect: Mood normal.         Behavior: Behavior normal.         Assessment/Plan   Diagnoses and all orders for this visit:  Systemic lupus erythematosus, unspecified SLE type, unspecified organ involvement status (Multi)  Fibromyalgia  -     pregabalin (Lyrica) 150 mg capsule; Take 1 capsule (150 mg) by mouth 2 times a day.  -     amitriptyline (Elavil) 10 mg tablet; Take 1 tablet (10 mg) by mouth once daily at bedtime.  Lumbosacral radiculopathy  -     pregabalin (Lyrica) 150 mg capsule; Take 1 capsule (150 mg) by mouth 2 times a day.  -     amitriptyline (Elavil) 10 mg tablet;  Take 1 tablet (10 mg) by mouth once daily at bedtime.  Osteoarthritis of spine with radiculopathy, lumbosacral region  -     pregabalin (Lyrica) 150 mg capsule; Take 1 capsule (150 mg) by mouth 2 times a day.       Patient is a 44-year-old female with a past medical history significant for the above-mentioned problems.  We discussed increasing her pregabalin to 150 mg twice a day on an uptitrating schedule, patient is agreeable, PDMP reviewed, Rx sent.  We also discussed trying amitriptyline 10 mg nightly to see if this can bring any further pain relief overall.  Risks and side effects discussed, patient is agreeable, Rx sent.  Otherwise, she will follow-up in 3 months, call clinic sooner if needed.

## 2025-01-20 ENCOUNTER — TELEPHONE (OUTPATIENT)
Dept: OBSTETRICS AND GYNECOLOGY | Facility: CLINIC | Age: 45
End: 2025-01-20
Payer: COMMERCIAL

## 2025-01-20 DIAGNOSIS — Z30.41 ENCOUNTER FOR SURVEILLANCE OF CONTRACEPTIVE PILLS: ICD-10-CM

## 2025-01-20 RX ORDER — NORETHINDRONE 0.35 MG/1
1 TABLET ORAL DAILY
Qty: 84 TABLET | Refills: 0 | Status: SHIPPED | OUTPATIENT
Start: 2025-01-20

## 2025-01-20 NOTE — TELEPHONE ENCOUNTER
Pt had to move her yearly appt due to son testing positive for covid. Pt needs a refill of her birth control sent to get her to that appt in April.     Thank you

## 2025-01-21 ENCOUNTER — APPOINTMENT (OUTPATIENT)
Dept: OBSTETRICS AND GYNECOLOGY | Facility: CLINIC | Age: 45
End: 2025-01-21
Payer: COMMERCIAL

## 2025-02-11 ENCOUNTER — OFFICE VISIT (OUTPATIENT)
Dept: PAIN MEDICINE | Facility: CLINIC | Age: 45
End: 2025-02-11
Payer: COMMERCIAL

## 2025-02-11 VITALS
HEART RATE: 74 BPM | SYSTOLIC BLOOD PRESSURE: 128 MMHG | BODY MASS INDEX: 39.68 KG/M2 | RESPIRATION RATE: 16 BRPM | DIASTOLIC BLOOD PRESSURE: 84 MMHG | WEIGHT: 224 LBS

## 2025-02-11 DIAGNOSIS — M54.17 LUMBOSACRAL RADICULOPATHY: ICD-10-CM

## 2025-02-11 DIAGNOSIS — M79.7 FIBROMYALGIA: ICD-10-CM

## 2025-02-11 DIAGNOSIS — M47.27 OSTEOARTHRITIS OF SPINE WITH RADICULOPATHY, LUMBOSACRAL REGION: ICD-10-CM

## 2025-02-11 PROCEDURE — 99213 OFFICE O/P EST LOW 20 MIN: CPT

## 2025-02-11 RX ORDER — PREGABALIN 200 MG/1
200 CAPSULE ORAL 2 TIMES DAILY
Qty: 60 CAPSULE | Refills: 2 | Status: SHIPPED | OUTPATIENT
Start: 2025-02-11

## 2025-02-11 RX ORDER — AMITRIPTYLINE HYDROCHLORIDE 10 MG/1
20 TABLET, FILM COATED ORAL NIGHTLY
Qty: 60 TABLET | Refills: 2 | Status: SHIPPED | OUTPATIENT
Start: 2025-02-11 | End: 2025-05-12

## 2025-02-11 ASSESSMENT — ENCOUNTER SYMPTOMS
ADENOPATHY: 0
FACIAL ASYMMETRY: 0
COUGH: 0
ALLERGIC/IMMUNOLOGIC NEGATIVE: 1
PALPITATIONS: 0
LIGHT-HEADEDNESS: 0
ENDOCRINE NEGATIVE: 1
BACK PAIN: 1
DYSPHORIC MOOD: 0
WHEEZING: 0
SHORTNESS OF BREATH: 0
ARTHRALGIAS: 0
CONSTITUTIONAL NEGATIVE: 1
EYES NEGATIVE: 1
WEAKNESS: 0
MYALGIAS: 1
BRUISES/BLEEDS EASILY: 0

## 2025-02-11 ASSESSMENT — PATIENT HEALTH QUESTIONNAIRE - PHQ9
2. FEELING DOWN, DEPRESSED OR HOPELESS: NOT AT ALL
SUM OF ALL RESPONSES TO PHQ9 QUESTIONS 1 AND 2: 0
1. LITTLE INTEREST OR PLEASURE IN DOING THINGS: NOT AT ALL

## 2025-02-11 NOTE — PROGRESS NOTES
Subjective   Patient ID: Ayde Ramírez is a 44 y.o. female who presents for Med Management (FOLLOW UP ON LYRICA INCREASE AND AMITRIPTYLINE, SHE TOLERATED THE INCREASE IN THE MEDICATION AND TAKING DICLOFENAC AT NIGHT BUT DOES NOT FEEL A DIFFERENCE IN HER PAIN, THE COLD WEATHER INCREASES HER PAIN, VERY ACHEY, HER PAIN IS MAINLY IN THE LOWER BACK AND DOWN HER BOTH LEGS TO HER TOES,  THE PAIN HAS INCREASED HER ANXIETY, SHE TRIES HOME STRETCHING, SHE HAS PAIN WITH TRANSITIONING, STANDING, WALKING, LAYING, SITTING, ADL, ).PAIN SCORE 7/10, MU=58%, COMM=5, DEP NO, SMOKING YES ED GIVEN  Clementine Gillespie CMA 02/11/25 11:11 AM     The patient is a 44-year-old female presents today for 3-month follow-up.  For medication management.  She currently rates her pain as 7/10 across her low back and radiating into bilateral legs to her feet, but endorses a generalized pain throughout her body.  At her last appointment we had increased her Lyrica to 150 mg twice a day and started her on amitriptyline 10 mg nightly.  She denies side effects to either medication, but also has not noticed much of a difference in her pain.  She is already on duloxetine 60 mg daily.  She is not interested in injections at this time, she says the last 1 she had were not effective and they are too expensive to keep trying with no guaranteed relief.  She would like to stick with medication management for the time being.        Review of Systems   Constitutional: Negative.    HENT: Negative.     Eyes: Negative.    Respiratory:  Negative for cough, shortness of breath and wheezing.    Cardiovascular:  Negative for chest pain, palpitations and leg swelling.   Endocrine: Negative.    Genitourinary: Negative.    Musculoskeletal:  Positive for back pain and myalgias. Negative for arthralgias.   Skin: Negative.    Allergic/Immunologic: Negative.    Neurological:  Negative for facial asymmetry, weakness and light-headedness.   Hematological:  Negative for  adenopathy. Does not bruise/bleed easily.   Psychiatric/Behavioral:  Negative for dysphoric mood and suicidal ideas.        Objective   Physical Exam  Constitutional:       General: She is not in acute distress.     Appearance: Normal appearance.   HENT:      Head: Normocephalic.      Mouth/Throat:      Mouth: Mucous membranes are moist.   Eyes:      Extraocular Movements: Extraocular movements intact.   Cardiovascular:      Rate and Rhythm: Normal rate and regular rhythm.      Pulses: Normal pulses.      Heart sounds: Normal heart sounds. No murmur heard.     No friction rub. No gallop.   Pulmonary:      Effort: Pulmonary effort is normal.      Breath sounds: Normal breath sounds. No wheezing, rhonchi or rales.   Abdominal:      General: Abdomen is flat.      Palpations: Abdomen is soft.   Musculoskeletal:      Cervical back: Normal range of motion.      Right lower leg: No edema.      Left lower leg: No edema.      Comments: Ambulates without assistance  Strength 5/5 BLE   Lymphadenopathy:      Cervical: No cervical adenopathy.   Skin:     General: Skin is warm and dry.   Neurological:      General: No focal deficit present.      Mental Status: She is alert and oriented to person, place, and time. Mental status is at baseline.   Psychiatric:         Mood and Affect: Mood normal.         Behavior: Behavior normal.         Assessment/Plan   Diagnoses and all orders for this visit:  Fibromyalgia  -     amitriptyline (Elavil) 10 mg tablet; Take 2 tablets (20 mg) by mouth once daily at bedtime.  -     pregabalin (Lyrica) 200 mg capsule; Take 1 capsule (200 mg) by mouth 2 times a day.  Lumbosacral radiculopathy  -     amitriptyline (Elavil) 10 mg tablet; Take 2 tablets (20 mg) by mouth once daily at bedtime.  -     pregabalin (Lyrica) 200 mg capsule; Take 1 capsule (200 mg) by mouth 2 times a day.  Osteoarthritis of spine with radiculopathy, lumbosacral region  -     pregabalin (Lyrica) 200 mg capsule; Take 1 capsule  (200 mg) by mouth 2 times a day.       The patient is a 44-year-old female with past medical history significant for the above-mentioned problems.  We discussed increasing her amitriptyline to 20 mg nightly and her pregabalin to 200 mg twice a day.  Patient is agreeable, PDMP reviewed, Rx sent.  We discussed that we can always go up further on these medications as long as she is not having any side effects to them, and if we reached the point that we max out her dosing without the desired relief, we could always look into trying out LDN or refer her for ketamine infusions if the pain seems to primarily be related to her fibromyalgia.  She will call us let us know if the medication increases do not seem to be helping much.  Otherwise, she will follow-up in 3 months, call the clinic sooner if needed.

## 2025-02-18 ENCOUNTER — APPOINTMENT (OUTPATIENT)
Dept: PAIN MEDICINE | Facility: CLINIC | Age: 45
End: 2025-02-18
Payer: COMMERCIAL

## 2025-02-28 LAB
25(OH)D3+25(OH)D2 SERPL-MCNC: 26 NG/ML (ref 30–100)
ALBUMIN SERPL-MCNC: 4.1 G/DL (ref 3.6–5.1)
ALP SERPL-CCNC: 86 U/L (ref 31–125)
ALT SERPL-CCNC: 18 U/L (ref 6–29)
ANION GAP SERPL CALCULATED.4IONS-SCNC: 12 MMOL/L (CALC) (ref 7–17)
AST SERPL-CCNC: 14 U/L (ref 10–30)
BILIRUB SERPL-MCNC: 0.4 MG/DL (ref 0.2–1.2)
BUN SERPL-MCNC: 7 MG/DL (ref 7–25)
CALCIUM SERPL-MCNC: 9.2 MG/DL (ref 8.6–10.2)
CHLORIDE SERPL-SCNC: 103 MMOL/L (ref 98–110)
CHOLEST SERPL-MCNC: 179 MG/DL
CHOLEST/HDLC SERPL: 4.5 (CALC)
CO2 SERPL-SCNC: 24 MMOL/L (ref 20–32)
CREAT SERPL-MCNC: 0.67 MG/DL (ref 0.5–0.99)
EGFRCR SERPLBLD CKD-EPI 2021: 111 ML/MIN/1.73M2
EST. AVERAGE GLUCOSE BLD GHB EST-MCNC: 143 MG/DL
EST. AVERAGE GLUCOSE BLD GHB EST-SCNC: 7.9 MMOL/L
GLUCOSE SERPL-MCNC: 129 MG/DL (ref 65–99)
HBA1C MFR BLD: 6.6 % OF TOTAL HGB
HDLC SERPL-MCNC: 40 MG/DL
LDLC SERPL CALC-MCNC: 96 MG/DL (CALC)
NONHDLC SERPL-MCNC: 139 MG/DL (CALC)
POTASSIUM SERPL-SCNC: 4.7 MMOL/L (ref 3.5–5.3)
PROT SERPL-MCNC: 6.4 G/DL (ref 6.1–8.1)
SODIUM SERPL-SCNC: 139 MMOL/L (ref 135–146)
TRIGL SERPL-MCNC: 326 MG/DL
VIT B12 SERPL-MCNC: 309 PG/ML (ref 200–1100)

## 2025-03-03 ENCOUNTER — APPOINTMENT (OUTPATIENT)
Dept: PRIMARY CARE | Facility: CLINIC | Age: 45
End: 2025-03-03
Payer: COMMERCIAL

## 2025-03-05 PROBLEM — E66.812 CLASS 2 SEVERE OBESITY DUE TO EXCESS CALORIES WITH SERIOUS COMORBIDITY AND BODY MASS INDEX (BMI) OF 39.0 TO 39.9 IN ADULT: Status: ACTIVE | Noted: 2023-08-24

## 2025-03-05 PROBLEM — R11.2 NAUSEA AND VOMITING: Status: RESOLVED | Noted: 2024-08-26 | Resolved: 2025-03-05

## 2025-03-06 ENCOUNTER — APPOINTMENT (OUTPATIENT)
Dept: RHEUMATOLOGY | Facility: CLINIC | Age: 45
End: 2025-03-06
Payer: COMMERCIAL

## 2025-03-06 DIAGNOSIS — J40 BRONCHITIS: ICD-10-CM

## 2025-03-06 DIAGNOSIS — D84.821 IMMUNOSUPPRESSION DUE TO DRUG THERAPY: ICD-10-CM

## 2025-03-06 DIAGNOSIS — Z79.899 IMMUNOSUPPRESSION DUE TO DRUG THERAPY: ICD-10-CM

## 2025-03-06 DIAGNOSIS — M32.9 SYSTEMIC LUPUS ERYTHEMATOSUS, UNSPECIFIED SLE TYPE, UNSPECIFIED ORGAN INVOLVEMENT STATUS (MULTI): Primary | ICD-10-CM

## 2025-03-06 DIAGNOSIS — M79.7 FIBROMYALGIA: ICD-10-CM

## 2025-03-06 PROCEDURE — 99213 OFFICE O/P EST LOW 20 MIN: CPT | Performed by: INTERNAL MEDICINE

## 2025-03-06 RX ORDER — DOXYCYCLINE 100 MG/1
100 CAPSULE ORAL 2 TIMES DAILY
Qty: 20 CAPSULE | Refills: 0 | Status: SHIPPED | OUTPATIENT
Start: 2025-03-06 | End: 2025-03-16

## 2025-03-06 ASSESSMENT — ENCOUNTER SYMPTOMS
EYES NEGATIVE: 1
SHORTNESS OF BREATH: 0
NUMBNESS: 0
ENDOCRINE NEGATIVE: 1
WEAKNESS: 0
MYALGIAS: 1
JOINT SWELLING: 0
COLOR CHANGE: 0
BACK PAIN: 0
ARTHRALGIAS: 1
FATIGUE: 1
COUGH: 1
PSYCHIATRIC NEGATIVE: 1
FEVER: 1
GASTROINTESTINAL NEGATIVE: 1

## 2025-03-06 ASSESSMENT — PATIENT HEALTH QUESTIONNAIRE - PHQ9
1. LITTLE INTEREST OR PLEASURE IN DOING THINGS: NOT AT ALL
SUM OF ALL RESPONSES TO PHQ9 QUESTIONS 1 & 2: 0
2. FEELING DOWN, DEPRESSED OR HOPELESS: NOT AT ALL

## 2025-03-06 NOTE — ASSESSMENT & PLAN NOTE
Pt is on immunosuppressive medication to control disease.  +URI currently  No signs of leukopenia that would increase risk.  Recommend that vaccinations be kept up to date to minimize risk    Orders:    Follow Up In Rheumatology; Future

## 2025-03-06 NOTE — PROGRESS NOTES
[facility] RHEUMATOLOGY     AND INTERNAL MEDICINE    RHEUMATOLOGY PROGRESS NOTE    Ayde Pinkkingsley 45 y.o. female  :  1980  PCP:  DILIA Neves    Chief Complaint   Patient presents with    Lupus    Fibromyalgia     Virtual or Telephone Consent    An interactive audio and video telecommunication system which permits real time communications between the patient (at the originating site) and provider (at the distant site) was utilized to provide this telehealth service.   Verbal consent was requested and obtained from Ayde PARIS Darrell on this date, 25 for a telehealth visit and the patient's location was confirmed at the time of the visit.    SUBJECTIVE  Pt converted visit to virtual due to acute illness   Pt has had a viral infection for 2 weeks and still has low grade fevers and cough.  Achy throughout and feels miserable.    Lupus is acting up because of it with increased rashes and joint pain.  Also has been having migraines and pre-HA emesis.    Hasn't been able to get in to see PCP      Records since last seen reviewed in [facility], [facility] and [facility]  Patient Active Problem List    Diagnosis Date Noted    Immunosuppression due to drug therapy 2024    Atrial tachycardia (CMS-HCC) 2023    Chronic diarrhea 2023    Fibromyalgia 2023    Hypothyroidism, adult 2023    Iron deficiency 2023    Leukocytosis 2023    Lumbosacral radiculopathy 2023    Lumbosacral spondylosis 2023    Migraine without aura and without status migrainosus, not intractable 2023    TAZ on CPAP 2023    Moderate episode of recurrent major depressive disorder 2023    Systemic lupus erythematosus (Multi) 2023    Class 2 severe obesity due to excess calories with serious comorbidity and body mass index (BMI) of 39.0 to 39.9 in adult 2023    Chronic GERD 2023    Essential  hypertension 04/11/2023    Vitamin D deficiency 04/11/2023    Mild hyperlipidemia 04/11/2023    Type 2 diabetes mellitus with hyperglycemia, without long-term current use of insulin 05/17/2022    Hidradenitis suppurativa 03/09/2022     Past Medical History:   Diagnosis Date    Amenorrhea 12/13/2019    History of amenorrhea    Caffeine use     Chronic pancreatitis (Multi) 08/24/2023    Chronic sinusitis 08/24/2023    COVID-19 vaccine series declined     Disease of thyroid gland     Nausea and vomiting 08/26/2024    Other hypersomnia 01/07/2021    Excessive daytime sleepiness    Personal history of urinary calculi 08/01/2018    History of renal calculi    Secondary pancreatic insufficiency ([facility]) 08/24/2023     Current Outpatient Medications on File Prior to Visit   Medication Sig Dispense Refill    amitriptyline (Elavil) 10 mg tablet Take 2 tablets (20 mg) by mouth once daily at bedtime. 60 tablet 2    amLODIPine (Norvasc) 5 mg tablet Take 1 tablet (5 mg) by mouth once daily. 90 tablet 1    atenolol (Tenormin) 50 mg tablet Take 2 tablets in am and 1 tablet in the pm. 270 tablet 1    atorvastatin (Lipitor) 20 mg tablet Take 1 tablet (20 mg) by mouth once daily at bedtime. 90 tablet 1    blood sugar diagnostic (Blood Glucose Test) strip TEST FOUR TIMES A DAY.      cholecalciferol (Vitamin D-3) 25 MCG (1000 UT) tablet Take 1 tablet (1,000 Units) by mouth once daily. 90 tablet 1    colestipol (Colestid) 1 gram tablet Take 1 tablet (1 g) by mouth 2 times a day after meals. Take at least 1 hour after or 4 hours before other medications. 60 tablet 5    cyanocobalamin (Vitamin B-12) 1,000 mcg tablet Take 1 tablet (1,000 mcg) by mouth once daily. 90 tablet 1    diclofenac sodium 1 % kit Apply topically 4 times a day as needed.      DULoxetine (Cymbalta) 60 mg DR capsule Take 1 capsule by mouth twice daily 180 capsule 3    Farxiga 5 mg Take 1 tablet (5 mg) by mouth once daily. 90 tablet 1    folic acid (Folvite) 1 mg tablet  Take 1 tablet (1 mg) by mouth once daily.      levothyroxine (Synthroid, Levoxyl) 25 mcg tablet Take 1 tablet (25 mcg) by mouth once daily. 90 tablet 1    lipase-protease-amylase (Zenpep) 40,000-126,000- 168,000 unit capsule,delayed release(DR/EC) Take by mouth.      magnesium chloride (MagDelay) 64 mg EC tablet Take by mouth.      metFORMIN  mg 24 hr tablet Take 2 tablets (1,000 mg) by mouth 2 times daily (morning and late afternoon). 360 tablet 1    mycophenolate (Cellcept) 500 mg tablet Take 1 tablet (500 mg) by mouth 2 times a day. 180 tablet 3    norethindrone (Jencycla) 0.35 mg tablet Take 1 tablet (0.35 mg) by mouth once daily. 84 tablet 0    omeprazole (PriLOSEC) 20 mg DR capsule Take 1 capsule (20 mg) by mouth once daily. 90 capsule 1    ondansetron ODT (Zofran-ODT) 8 mg disintegrating tablet Take 1 tablet (8 mg) by mouth every 8 hours if needed for nausea or vomiting. 20 tablet 1    predniSONE (Deltasone) 10 mg tablet Take 1 tablet (10 mg) by mouth once daily. 90 tablet 3    pregabalin (Lyrica) 200 mg capsule Take 1 capsule (200 mg) by mouth 2 times a day. 60 capsule 2    semaglutide (Rybelsus) 7 mg tablet Take 1 tablet (7 mg) by mouth once daily. 90 tablet 1    simethicone (Gas Relief, simethicone,) 80 mg chewable tablet Chew 1 tablet (80 mg) every 6 hours if needed for flatulence. 30 tablet 1    spironolactone (Aldactone) 25 mg tablet Take 1 tablet (25 mg) by mouth once daily. 90 tablet 1    tiZANidine (Zanaflex) 4 mg tablet Take 1 tablet (4 mg) by mouth every 8 hours if needed for muscle spasms. 90 tablet 3     No current facility-administered medications on file prior to visit.     Allergies   Allergen Reactions    Adhesive Tape-Silicones Rash     Social History     Tobacco Use    Smoking status: Every Day     Current packs/day: 0.50     Average packs/day: 0.5 packs/day for 20.0 years (10.0 ttl pk-yrs)     Types: Cigarettes    Smokeless tobacco: Never   Vaping Use    Vaping status: Never Used    Substance Use Topics    Alcohol use: Never    Drug use: Never     Review of Systems   Constitutional:  Positive for fatigue and fever.   HENT:  Positive for congestion.    Eyes: Negative.    Respiratory:  Positive for cough. Negative for shortness of breath.    Cardiovascular:  Negative for leg swelling.   Gastrointestinal: Negative.    Endocrine: Negative.    Genitourinary: Negative.    Musculoskeletal:  Positive for arthralgias and myalgias. Negative for back pain, gait problem and joint swelling.   Skin:  Positive for rash. Negative for color change.   Neurological:  Negative for weakness and numbness.   Psychiatric/Behavioral: Negative.         PHYSICAL EXAM  LMP 02/24/2023   Depression: Not at risk (3/6/2025)    PHQ-2     PHQ-2 Score: 0     Physical Exam  Vitals reviewed.   Constitutional:       General: She is not in acute distress.     Appearance: Normal appearance. She is not ill-appearing.   HENT:      Head: Normocephalic and atraumatic.   Eyes:      Extraocular Movements: Extraocular movements intact.      Conjunctiva/sclera: Conjunctivae normal.      Pupils: Pupils are equal, round, and reactive to light.   Pulmonary:      Effort: Pulmonary effort is normal. No respiratory distress.   Musculoskeletal:      Cervical back: Normal range of motion.      Comments: No visible abnormalities noted   Skin:     Findings: Rash (malar rash) present.   Neurological:      General: No focal deficit present.      Mental Status: She is alert and oriented to person, place, and time. Mental status is at baseline.   Psychiatric:         Mood and Affect: Mood normal.           Health Maintenance Due   Topic Date Due    HIV Screening  Never done    Diabetes: Retinopathy Screening  Never done    Hepatitis B Vaccines (1 of 3 - 19+ 3-dose series) Never done    Zoster Vaccines (1 of 2) Never done    DTaP/Tdap/Td Vaccines (1 - Tdap) 09/16/2004    Mammogram  Never done    Colorectal Cancer Screening  02/18/2024    Diabetes: Urine  Protein Screening  03/10/2024    Influenza Vaccine (1) Never done       Assessment/plan  Assessment & Plan  Systemic lupus erythematosus, unspecified SLE type, unspecified organ involvement status (Multi)  On cellcept.   Pt to continue meds  Recommend labs once she has recovered from acute infection  Orders:    Anti-DNA Antibody, Double-Stranded; Future    C3 Complement; Future    C4 Complement; Future    CBC and Auto Differential; Future    C-Reactive Protein; Future    Sedimentation Rate; Future    Follow Up In Rheumatology; Future    Immunosuppression due to drug therapy  Pt is on immunosuppressive medication to control disease.  +URI currently  No signs of leukopenia that would increase risk.  Recommend that vaccinations be kept up to date to minimize risk    Orders:    Follow Up In Rheumatology; Future    Fibromyalgia    Orders:    Follow Up In Rheumatology; Future    Bronchitis  Doxycycline called in and if not better, pt needs to see PCP  Orders:    doxycycline (Monodox) 100 mg capsule; Take 1 capsule (100 mg) by mouth 2 times a day for 10 days. Take with at least 8 ounces (large glass) of water, do not lie down for 30 minutes after      Follow up: __4___months, sooner if change in symptoms    Immunization History   Administered Date(s) Administered    MMR vaccine, subcutaneous (MMR II) 04/12/1995    Td (adult), unspecified 09/15/2004           Orders Only on 02/27/2025   Component Date Value Ref Range Status    CHOLESTEROL, TOTAL 02/27/2025 179  <200 mg/dL Final    HDL CHOLESTEROL 02/27/2025 40 (L)  > OR = 50 mg/dL Final    TRIGLYCERIDES 02/27/2025 326 (H)  <150 mg/dL Final    Comment:    If a non-fasting specimen was collected, consider  repeat triglyceride testing on a fasting specimen  if clinically indicated.   Ria et al. J. of Clin. Lipidol. 2015;9:129-169.         LDL-CHOLESTEROL 02/27/2025 96  mg/dL (calc) Final    Comment: Reference range: <100     Desirable range <100 mg/dL for primary  prevention;    <70 mg/dL for patients with CHD or diabetic patients   with > or = 2 CHD risk factors.     LDL-C is now calculated using the Emil   calculation, which is a validated novel method providing   better accuracy than the Friedewald equation in the   estimation of LDL-C.   Graham SMITH et al. SAM. 2013;310(19): 3930-2063   (http://Everlater.Michael Bieker/faq/LRS267)      CHOL/HDLC RATIO 02/27/2025 4.5  <5.0 (calc) Final    NON HDL CHOLESTEROL 02/27/2025 139 (H)  <130 mg/dL (calc) Final    Comment: For patients with diabetes plus 1 major ASCVD risk   factor, treating to a non-HDL-C goal of <100 mg/dL   (LDL-C of <70 mg/dL) is considered a therapeutic   option.      GLUCOSE 02/27/2025 129 (H)  65 - 99 mg/dL Final    Comment:               Fasting reference interval     For someone without known diabetes, a glucose  value >125 mg/dL indicates that they may have  diabetes and this should be confirmed with a  follow-up test.         UREA NITROGEN (BUN) 02/27/2025 7  7 - 25 mg/dL Final    CREATININE 02/27/2025 0.67  0.50 - 0.99 mg/dL Final    EGFR 02/27/2025 111  > OR = 60 mL/min/1.73m2 Final    SODIUM 02/27/2025 139  135 - 146 mmol/L Final    POTASSIUM 02/27/2025 4.7  3.5 - 5.3 mmol/L Final    CHLORIDE 02/27/2025 103  98 - 110 mmol/L Final    CARBON DIOXIDE 02/27/2025 24  20 - 32 mmol/L Final    ELECTROLYTE BALANCE 02/27/2025 12  7 - 17 mmol/L (calc) Final    CALCIUM 02/27/2025 9.2  8.6 - 10.2 mg/dL Final    PROTEIN, TOTAL 02/27/2025 6.4  6.1 - 8.1 g/dL Final    ALBUMIN 02/27/2025 4.1  3.6 - 5.1 g/dL Final    BILIRUBIN, TOTAL 02/27/2025 0.4  0.2 - 1.2 mg/dL Final    ALKALINE PHOSPHATASE 02/27/2025 86  31 - 125 U/L Final    AST 02/27/2025 14  10 - 30 U/L Final    ALT 02/27/2025 18  6 - 29 U/L Final    VITAMIN B12 02/27/2025 309  200 - 1,100 pg/mL Final    Comment:    Please Note: Although the reference range for vitamin  B12 is 200-1100 pg/mL, it has been reported that between  5 and 10% of  patients with values between 200 and 400  pg/mL may experience neuropsychiatric and hematologic  abnormalities due to occult B12 deficiency; less than 1%  of patients with values above 400 pg/mL will have symptoms.         VITAMIN D,25-OH,TOTAL,IA 02/27/2025 26 (L)  30 - 100 ng/mL Final    Comment: Vitamin D Status         25-OH Vitamin D:     Deficiency:                    <20 ng/mL  Insufficiency:             20 - 29 ng/mL  Optimal:                 > or = 30 ng/mL     For 25-OH Vitamin D testing on patients on   D2-supplementation and patients for whom quantitation   of D2 and D3 fractions is required, the QuestAssureD(TM)  25-OH VIT D, (D2,D3), LC/MS/MS is recommended: order   code 84029 (patients >2yrs).     See Note 1     Note 1     For additional information, please refer to   http://education.[employer]ale/faq/KKT741   (This link is being provided for informational/  educational purposes only.)      HEMOGLOBIN A1c 02/27/2025 6.6 (H)  <5.7 % of total Hgb Final    Comment: For someone without known diabetes, a hemoglobin A1c  value of 6.5% or greater indicates that they may have   diabetes and this should be confirmed with a follow-up   test.     For someone with known diabetes, a value <7% indicates   that their diabetes is well controlled and a value   greater than or equal to 7% indicates suboptimal   control. A1c targets should be individualized based on   duration of diabetes, age, comorbid conditions, and   other considerations.     Currently, no consensus exists regarding use of  hemoglobin A1c for diagnosis of diabetes for children.          eAG (mg/dL) 02/27/2025 143  mg/dL Final    eAG (mmol/L) 02/27/2025 7.9  mmol/L Final     Patient was identified as a fall risk. Risk prevention instructions provided.

## 2025-03-06 NOTE — ASSESSMENT & PLAN NOTE
On cellcept.   Pt to continue meds  Recommend labs once she has recovered from acute infection  Orders:    Anti-DNA Antibody, Double-Stranded; Future    C3 Complement; Future    C4 Complement; Future    CBC and Auto Differential; Future    C-Reactive Protein; Future    Sedimentation Rate; Future    Follow Up In Rheumatology; Future

## 2025-03-06 NOTE — PATIENT INSTRUCTIONS
It was a pleasure to see you today  Please call if your symptoms worsen  Please review your summary for education and reminders.  Follow up at your next appointment.    If you had labs/xrays done today, you will be able to view on [employer]â„¢ Audio.   We will contact you when the results are reviewed for further discussion.  Please note that you may receive your results before I have had a chance to review.  Please know I will be contacting you for discussion  Homegoing instructions for all patient  A healthy lifestyle helps chronic diseases  These are all the goals you should strive to improve your overall health   Blood pressure <130/85   BMI of <30 or waist circumference that is 1/2 of your height   Fasting blood sugar <107 (if you are diabetic, aim for an A1c <6.4%_   LDL cholesterol <130   Avoid smoking   Manage your stress   Get your preventive exams   Get your immunizations   Some guidelines for all patients with lupus  Wear sunscreen, even on cloudy days.   Sun can worsening rashes and cause fatigue and flares  Exercise.  Movement is medicine.   Even stretches and light yoga count  If you smoke, stop.  Smoking makes skin manifestations of lupus much harder to treat.  Lupus (and the medications you need to be on) increase infection risk.  Keep up to date with your vaccinations  Lupus increases heart and stroke risk.  Make sure you work to get your cholesterol and blood pressure under control. If you have diabetes, it is important to get your A1c below 7%  If you are on hydroxychloroquine (plaquenil), make sure you see your eye doctor regularly and get testing to monitor for any potential changes  Think about your bone health.   Osteoporosis is a risk, especially if you are or have been on steroids.      Mental health can be an issue.   Not only does having a diagnosis of lupus cause depression but lupus itself can be the cause of mental health issues.  If you feel you need help, please call me or your primary care  doctor.   We are here for you           Ways to Help Prevent Falls at Home    Quick Tips   ? Ask for help if you need it. Most people want to help!   ? Get up slowly after sitting or laying down   ? Wear a medical alert device or keep cell phone in your pocket   ? Use night lights, especially areas near a bathroom   ? Keep the items you use often within reach on a small stool or end table   ? Use an assistive device such as walker or cane, as directed by provider/physical therapy   ? Use a non-slip mat and grab bars in your bathroom. Look for home health sections for best options     Other Areas to Focus On   ? Exercise and nutrition: Regular exercise or taking a falls prevention class are great ways improve strength and balance. Don’t forget to stay hydrated and bring a snack!   ? Medicine side effects: Some medicines can make you sleepy or dizzy, which could cause a fall. Ask your healthcare provider about the side effects your medicines could cause. Be sure to let them know if you take any vitamins or supplements as well.   ? Tripping hazards: Remove items you could trip on, such as loose mats, rugs, cords, and clutter. Wear closed toe shoes with rubber soles.   ? Health and wellness: Get regular checkups with your healthcare provider, plus routine vision and hearing screenings. Talk with your healthcare provider about:   o Your medicines and the possible side effects - bring them in a bag if that is easier!   o Problems with balance or feeling dizzy   o Ways to promote bone health, such as Vitamin D and calcium supplements   o Questions or concerns about falling     *Ask your healthcare team if you have questions     [facility], 2022

## 2025-03-27 LAB
BASOPHILS # BLD AUTO: 82 CELLS/UL (ref 0–200)
BASOPHILS NFR BLD AUTO: 0.5 %
C3 SERPL-MCNC: 186 MG/DL (ref 83–193)
C4 SERPL-MCNC: 35 MG/DL (ref 15–57)
CRP SERPL-MCNC: 7.4 MG/L
DSDNA AB SER-ACNC: <1 IU/ML
EOSINOPHIL # BLD AUTO: 394 CELLS/UL (ref 15–500)
EOSINOPHIL NFR BLD AUTO: 2.4 %
ERYTHROCYTE [DISTWIDTH] IN BLOOD BY AUTOMATED COUNT: 13.6 % (ref 11–15)
ERYTHROCYTE [SEDIMENTATION RATE] IN BLOOD BY WESTERGREN METHOD: 14 MM/H
HCT VFR BLD AUTO: 44.4 % (ref 35–45)
HGB BLD-MCNC: 14.8 G/DL (ref 11.7–15.5)
LYMPHOCYTES # BLD AUTO: 5396 CELLS/UL (ref 850–3900)
LYMPHOCYTES NFR BLD AUTO: 32.9 %
MCH RBC QN AUTO: 29.1 PG (ref 27–33)
MCHC RBC AUTO-ENTMCNC: 33.3 G/DL (ref 32–36)
MCV RBC AUTO: 87.4 FL (ref 80–100)
MONOCYTES # BLD AUTO: 754 CELLS/UL (ref 200–950)
MONOCYTES NFR BLD AUTO: 4.6 %
NEUTROPHILS # BLD AUTO: 9774 CELLS/UL (ref 1500–7800)
NEUTROPHILS NFR BLD AUTO: 59.6 %
PLATELET # BLD AUTO: 419 THOUSAND/UL (ref 140–400)
PMV BLD REES-ECKER: 10.9 FL (ref 7.5–12.5)
RBC # BLD AUTO: 5.08 MILLION/UL (ref 3.8–5.1)
WBC # BLD AUTO: 16.4 THOUSAND/UL (ref 3.8–10.8)

## 2025-04-04 ENCOUNTER — APPOINTMENT (OUTPATIENT)
Dept: PRIMARY CARE | Facility: CLINIC | Age: 45
End: 2025-04-04
Payer: COMMERCIAL

## 2025-04-04 VITALS
DIASTOLIC BLOOD PRESSURE: 80 MMHG | SYSTOLIC BLOOD PRESSURE: 124 MMHG | BODY MASS INDEX: 39.55 KG/M2 | HEART RATE: 98 BPM | WEIGHT: 223.25 LBS | HEIGHT: 63 IN

## 2025-04-04 DIAGNOSIS — E53.8 B12 DEFICIENCY: ICD-10-CM

## 2025-04-04 DIAGNOSIS — Z00.00 ROUTINE GENERAL MEDICAL EXAMINATION AT HEALTH CARE FACILITY: Primary | ICD-10-CM

## 2025-04-04 DIAGNOSIS — I10 ESSENTIAL HYPERTENSION: ICD-10-CM

## 2025-04-04 DIAGNOSIS — E78.5 MILD HYPERLIPIDEMIA: ICD-10-CM

## 2025-04-04 DIAGNOSIS — E11.65 TYPE 2 DIABETES MELLITUS WITH HYPERGLYCEMIA, WITHOUT LONG-TERM CURRENT USE OF INSULIN: ICD-10-CM

## 2025-04-04 DIAGNOSIS — F33.1 MODERATE EPISODE OF RECURRENT MAJOR DEPRESSIVE DISORDER: ICD-10-CM

## 2025-04-04 DIAGNOSIS — E55.9 VITAMIN D DEFICIENCY: ICD-10-CM

## 2025-04-04 DIAGNOSIS — E66.812 CLASS 2 SEVERE OBESITY DUE TO EXCESS CALORIES WITH SERIOUS COMORBIDITY AND BODY MASS INDEX (BMI) OF 39.0 TO 39.9 IN ADULT: ICD-10-CM

## 2025-04-04 DIAGNOSIS — K21.9 CHRONIC GERD: ICD-10-CM

## 2025-04-04 DIAGNOSIS — I47.19 ATRIAL TACHYCARDIA (CMS-HCC): ICD-10-CM

## 2025-04-04 DIAGNOSIS — E66.01 CLASS 2 SEVERE OBESITY DUE TO EXCESS CALORIES WITH SERIOUS COMORBIDITY AND BODY MASS INDEX (BMI) OF 39.0 TO 39.9 IN ADULT: ICD-10-CM

## 2025-04-04 RX ORDER — DULAGLUTIDE 1.5 MG/.5ML
1.5 INJECTION, SOLUTION SUBCUTANEOUS WEEKLY
Qty: 2 ML | Refills: 2 | Status: SHIPPED | OUTPATIENT
Start: 2025-04-04

## 2025-04-04 RX ORDER — DAPAGLIFLOZIN 5 MG/1
5 TABLET, FILM COATED ORAL DAILY
Qty: 90 TABLET | Refills: 1 | Status: SHIPPED | OUTPATIENT
Start: 2025-04-04

## 2025-04-04 RX ORDER — ATORVASTATIN CALCIUM 40 MG/1
40 TABLET, FILM COATED ORAL NIGHTLY
Qty: 90 TABLET | Refills: 1 | Status: SHIPPED | OUTPATIENT
Start: 2025-04-04

## 2025-04-04 RX ORDER — AMLODIPINE BESYLATE 5 MG/1
5 TABLET ORAL DAILY
Qty: 90 TABLET | Refills: 1 | Status: SHIPPED | OUTPATIENT
Start: 2025-04-04

## 2025-04-04 RX ORDER — ATORVASTATIN CALCIUM 20 MG/1
20 TABLET, FILM COATED ORAL NIGHTLY
Qty: 90 TABLET | Refills: 1 | Status: SHIPPED | OUTPATIENT
Start: 2025-04-04 | End: 2025-04-04 | Stop reason: SDUPTHER

## 2025-04-04 RX ORDER — SPIRONOLACTONE 25 MG/1
25 TABLET ORAL DAILY
Qty: 90 TABLET | Refills: 1 | Status: SHIPPED | OUTPATIENT
Start: 2025-04-04

## 2025-04-04 RX ORDER — OMEPRAZOLE 20 MG/1
20 CAPSULE, DELAYED RELEASE ORAL DAILY
Qty: 90 CAPSULE | Refills: 1 | Status: SHIPPED | OUTPATIENT
Start: 2025-04-04

## 2025-04-04 RX ORDER — ACETAMINOPHEN 500 MG
5000 TABLET ORAL DAILY
Qty: 90 TABLET | Refills: 3 | Status: SHIPPED | OUTPATIENT
Start: 2025-04-04

## 2025-04-04 RX ORDER — METFORMIN HYDROCHLORIDE 500 MG/1
1000 TABLET, EXTENDED RELEASE ORAL
Qty: 360 TABLET | Refills: 1 | Status: SHIPPED | OUTPATIENT
Start: 2025-04-04

## 2025-04-04 ASSESSMENT — ENCOUNTER SYMPTOMS
NAUSEA: 0
BLOOD IN STOOL: 0
FATIGUE: 0
NERVOUS/ANXIOUS: 1
LOSS OF SENSATION IN FEET: 1
SHORTNESS OF BREATH: 0
MYALGIAS: 0
DEPRESSION: 0
OCCASIONAL FEELINGS OF UNSTEADINESS: 0
SLEEP DISTURBANCE: 0
DYSURIA: 0
DIZZINESS: 0
PALPITATIONS: 0
ARTHRALGIAS: 0
DIARRHEA: 1
LIGHT-HEADEDNESS: 0
VOMITING: 1
COLOR CHANGE: 0
HEADACHES: 0
CHEST TIGHTNESS: 0
ABDOMINAL PAIN: 0
CONSTIPATION: 0

## 2025-04-04 ASSESSMENT — ACTIVITIES OF DAILY LIVING (ADL)
DRESSING: INDEPENDENT
GROCERY_SHOPPING: INDEPENDENT
DOING_HOUSEWORK: INDEPENDENT
MANAGING_FINANCES: INDEPENDENT
TAKING_MEDICATION: INDEPENDENT
BATHING: INDEPENDENT

## 2025-04-04 ASSESSMENT — PATIENT HEALTH QUESTIONNAIRE - PHQ9
1. LITTLE INTEREST OR PLEASURE IN DOING THINGS: NOT AT ALL
2. FEELING DOWN, DEPRESSED OR HOPELESS: SEVERAL DAYS
SUM OF ALL RESPONSES TO PHQ9 QUESTIONS 1 AND 2: 1

## 2025-04-04 NOTE — ASSESSMENT & PLAN NOTE
2/27/25: Vitamin D level 26  Start vitamin D 5,000 international units daily  Orders:    cholecalciferol (Vitamin D-3) 5,000 Units tablet; Take 1 tablet (5,000 Units) by mouth once daily.    Vitamin D 25-Hydroxy,Total (for eval of Vitamin D levels); Future

## 2025-04-04 NOTE — ASSESSMENT & PLAN NOTE
2/27/25: A1c 6.6%  Start Trulicity 1.5 mg weekly  Stop rybelsus once starting trulicity  Also may decrease metformin to once a day once truclitiy started  Continue farxiga 5 mg dailyi  Orders:    Farxiga 5 mg tablet; Take 1 tablet (5 mg) by mouth once daily.    metFORMIN  mg 24 hr tablet; Take 2 tablets (1,000 mg) by mouth 2 times daily (morning and late afternoon).    dulaglutide (Trulicity) 1.5 mg/0.5 mL pen injector injection; Inject 1.5 mg under the skin 1 (one) time per week.    Comprehensive Metabolic Panel; Future    Hemoglobin A1C; Future

## 2025-04-04 NOTE — PROGRESS NOTES
"Subjective   Reason for Visit: Ayde Ramírez is an 45 y.o. female here for a Medicare Wellness visit.     Past Medical, Surgical, and Family History reviewed and updated in chart.    Reviewed all medications by prescribing practitioner or clinical pharmacist (such as prescriptions, OTCs, herbal therapies and supplements) and documented in the medical record.    CONSTANCE Messer returns for W exam, and medication refills.    DM: she reports when she takes the rybelsus she gets sick to her stomach and will vomit. She reports she did better on trulicity. We will change her back to this. We had to change her to rybelsus due to availability.     Has an elevated white blood cell count. Was sick 2 weeks prior to her lab work. Will have her make an appt with her hematologist to review labs.    Mammogram:  needs to schedule  Pap: needs to schedule with [facility]' care    Patient Care Team:  DILIA Neves as PCP - General (Family Medicine)  DILIA Sanderson as Nurse Practitioner (Hematology and Oncology)  Karen Celis MD as Referring Physician (Rheumatology)     Review of Systems   Constitutional:  Negative for fatigue.   HENT: Negative.     Respiratory:  Negative for chest tightness and shortness of breath.    Cardiovascular:  Negative for chest pain, palpitations and leg swelling.   Gastrointestinal:  Positive for diarrhea and vomiting (intermittent). Negative for abdominal pain, blood in stool, constipation and nausea.   Genitourinary:  Negative for dysuria.   Musculoskeletal:  Negative for arthralgias and myalgias.   Skin:  Negative for color change.   Neurological:  Negative for dizziness, light-headedness and headaches.   Psychiatric/Behavioral:  Positive for suicidal ideas. Negative for self-injury and sleep disturbance. The patient is nervous/anxious (at times).        Objective   Vitals:  /80   Pulse 98   Ht 1.6 m (5' 3\")   Wt 101 kg (223 lb 4 oz)   LMP 02/24/2023   BMI 39.55 kg/m²   "     Physical Exam  Vitals and nursing note reviewed.   Constitutional:       Appearance: Normal appearance.   Cardiovascular:      Rate and Rhythm: Normal rate and regular rhythm.      Pulses: Normal pulses.   Pulmonary:      Effort: Pulmonary effort is normal.   Neurological:      General: No focal deficit present.      Mental Status: She is alert and oriented to person, place, and time.   Psychiatric:         Mood and Affect: Mood normal.         Behavior: Behavior normal.         Thought Content: Thought content normal.         Judgment: Judgment normal.         Assessment & Plan  Routine general medical examination at health care facility         Type 2 diabetes mellitus with hyperglycemia, without long-term current use of insulin  2/27/25: A1c 6.6%  Start Trulicity 1.5 mg weekly  Stop rybelsus once starting trulicity  Also may decrease metformin to once a day once truclitiy started  Continue farxiga 5 mg dailyi  Orders:    Farxiga 5 mg tablet; Take 1 tablet (5 mg) by mouth once daily.    metFORMIN  mg 24 hr tablet; Take 2 tablets (1,000 mg) by mouth 2 times daily (morning and late afternoon).    dulaglutide (Trulicity) 1.5 mg/0.5 mL pen injector injection; Inject 1.5 mg under the skin 1 (one) time per week.    Comprehensive Metabolic Panel; Future    Hemoglobin A1C; Future    Essential hypertension  controlled  Orders:    amLODIPine (Norvasc) 5 mg tablet; Take 1 tablet (5 mg) by mouth once daily.    spironolactone (Aldactone) 25 mg tablet; Take 1 tablet (25 mg) by mouth once daily.    Mild hyperlipidemia  Increase atorvastatin to 40 mg daily  2/27/25: Chol 179, HDL 40, LDL 96, Triglycerides 326  Orders:    atorvastatin (Lipitor) 40 mg tablet; Take 1 tablet (40 mg) by mouth once daily at bedtime.    Comprehensive Metabolic Panel; Future    Lipid Panel; Future    Atrial tachycardia (CMS-HCC)  Stable.   Not tachycardic today.     Atenolol 50 mg daily          Chronic GERD    Orders:    omeprazole (PriLOSEC)  20 mg DR capsule; Take 1 capsule (20 mg) by mouth once daily.    Vitamin D deficiency  2/27/25: Vitamin D level 26  Start vitamin D 5,000 international units daily  Orders:    cholecalciferol (Vitamin D-3) 5,000 Units tablet; Take 1 tablet (5,000 Units) by mouth once daily.    Vitamin D 25-Hydroxy,Total (for eval of Vitamin D levels); Future    B12 deficiency  2/27/25: vitamin B12 level 309  Orders:    Vitamin B12; Future    Moderate episode of recurrent major depressive disorder  Stable.  No signs of worsening.           Class 2 severe obesity due to excess calories with serious comorbidity and body mass index (BMI) of 39.0 to 39.9 in adult         Depression Screening  5 - 10 minutes were spent screening for depression.     Follow up in 6 months for chronic care, with lab work before appointment.     For any new medications that were prescribed today, the patient was educated about their indications for use, administration, frequency and potential side effects of the medication.

## 2025-04-04 NOTE — ASSESSMENT & PLAN NOTE
controlled  Orders:    amLODIPine (Norvasc) 5 mg tablet; Take 1 tablet (5 mg) by mouth once daily.    spironolactone (Aldactone) 25 mg tablet; Take 1 tablet (25 mg) by mouth once daily.

## 2025-04-04 NOTE — ASSESSMENT & PLAN NOTE
Increase atorvastatin to 40 mg daily  2/27/25: Chol 179, HDL 40, LDL 96, Triglycerides 326  Orders:    atorvastatin (Lipitor) 40 mg tablet; Take 1 tablet (40 mg) by mouth once daily at bedtime.    Comprehensive Metabolic Panel; Future    Lipid Panel; Future

## 2025-04-11 DIAGNOSIS — R11.2 NAUSEA AND VOMITING, UNSPECIFIED VOMITING TYPE: ICD-10-CM

## 2025-04-11 RX ORDER — ONDANSETRON 8 MG/1
8 TABLET, ORALLY DISINTEGRATING ORAL EVERY 8 HOURS PRN
Qty: 20 TABLET | Refills: 1 | Status: SHIPPED | OUTPATIENT
Start: 2025-04-11

## 2025-04-30 ENCOUNTER — APPOINTMENT (OUTPATIENT)
Dept: OBSTETRICS AND GYNECOLOGY | Facility: CLINIC | Age: 45
End: 2025-04-30
Payer: COMMERCIAL

## 2025-04-30 VITALS
SYSTOLIC BLOOD PRESSURE: 122 MMHG | BODY MASS INDEX: 39.42 KG/M2 | HEIGHT: 63 IN | DIASTOLIC BLOOD PRESSURE: 74 MMHG | WEIGHT: 222.5 LBS

## 2025-04-30 DIAGNOSIS — Z12.4 ENCOUNTER FOR SCREENING FOR CERVICAL CANCER: ICD-10-CM

## 2025-04-30 DIAGNOSIS — Z01.419 ENCOUNTER FOR ANNUAL ROUTINE GYNECOLOGICAL EXAMINATION: ICD-10-CM

## 2025-04-30 DIAGNOSIS — Z12.31 ENCOUNTER FOR SCREENING MAMMOGRAM FOR MALIGNANT NEOPLASM OF BREAST: ICD-10-CM

## 2025-04-30 DIAGNOSIS — Z30.41 ENCOUNTER FOR SURVEILLANCE OF CONTRACEPTIVE PILLS: ICD-10-CM

## 2025-04-30 PROCEDURE — 3074F SYST BP LT 130 MM HG: CPT | Performed by: OBSTETRICS & GYNECOLOGY

## 2025-04-30 PROCEDURE — G0101 CA SCREEN;PELVIC/BREAST EXAM: HCPCS | Performed by: OBSTETRICS & GYNECOLOGY

## 2025-04-30 PROCEDURE — 3078F DIAST BP <80 MM HG: CPT | Performed by: OBSTETRICS & GYNECOLOGY

## 2025-04-30 PROCEDURE — 3008F BODY MASS INDEX DOCD: CPT | Performed by: OBSTETRICS & GYNECOLOGY

## 2025-04-30 RX ORDER — NORETHINDRONE 0.35 MG/1
1 TABLET ORAL DAILY
Qty: 84 TABLET | Refills: 3 | Status: SHIPPED | OUTPATIENT
Start: 2025-04-30

## 2025-04-30 SDOH — ECONOMIC STABILITY: TRANSPORTATION INSECURITY
IN THE PAST 12 MONTHS, HAS THE LACK OF TRANSPORTATION KEPT YOU FROM MEDICAL APPOINTMENTS OR FROM GETTING MEDICATIONS?: NO

## 2025-04-30 SDOH — ECONOMIC STABILITY: FOOD INSECURITY: WITHIN THE PAST 12 MONTHS, YOU WORRIED THAT YOUR FOOD WOULD RUN OUT BEFORE YOU GOT MONEY TO BUY MORE.: NEVER TRUE

## 2025-04-30 SDOH — ECONOMIC STABILITY: INCOME INSECURITY: IN THE LAST 12 MONTHS, WAS THERE A TIME WHEN YOU WERE NOT ABLE TO PAY THE MORTGAGE OR RENT ON TIME?: NO

## 2025-04-30 SDOH — ECONOMIC STABILITY: FOOD INSECURITY: WITHIN THE PAST 12 MONTHS, THE FOOD YOU BOUGHT JUST DIDN'T LAST AND YOU DIDN'T HAVE MONEY TO GET MORE.: NEVER TRUE

## 2025-04-30 SDOH — ECONOMIC STABILITY: TRANSPORTATION INSECURITY
IN THE PAST 12 MONTHS, HAS LACK OF TRANSPORTATION KEPT YOU FROM MEETINGS, WORK, OR FROM GETTING THINGS NEEDED FOR DAILY LIVING?: NO

## 2025-04-30 ASSESSMENT — SOCIAL DETERMINANTS OF HEALTH (SDOH)
WITHIN THE LAST YEAR, HAVE YOU BEEN AFRAID OF YOUR PARTNER OR EX-PARTNER?: NO
WITHIN THE LAST YEAR, HAVE YOU BEEN KICKED, HIT, SLAPPED, OR OTHERWISE PHYSICALLY HURT BY YOUR PARTNER OR EX-PARTNER?: NO
WITHIN THE LAST YEAR, HAVE YOU BEEN HUMILIATED OR EMOTIONALLY ABUSED IN OTHER WAYS BY YOUR PARTNER OR EX-PARTNER?: NO
WITHIN THE LAST YEAR, HAVE TO BEEN RAPED OR FORCED TO HAVE ANY KIND OF SEXUAL ACTIVITY BY YOUR PARTNER OR EX-PARTNER?: NO

## 2025-04-30 ASSESSMENT — LIFESTYLE VARIABLES
HOW MANY STANDARD DRINKS CONTAINING ALCOHOL DO YOU HAVE ON A TYPICAL DAY: PATIENT DOES NOT DRINK
HOW OFTEN DO YOU HAVE A DRINK CONTAINING ALCOHOL: NEVER

## 2025-04-30 ASSESSMENT — PATIENT HEALTH QUESTIONNAIRE - PHQ9
SUM OF ALL RESPONSES TO PHQ9 QUESTIONS 1 AND 2: 0
2. FEELING DOWN, DEPRESSED OR HOPELESS: NOT AT ALL
1. LITTLE INTEREST OR PLEASURE IN DOING THINGS: NOT AT ALL

## 2025-04-30 ASSESSMENT — PAIN SCALES - GENERAL: PAINLEVEL_OUTOF10: 0-NO PAIN

## 2025-04-30 NOTE — PROGRESS NOTES
"Ayde Ramírez is a 45 y.o. female who is here for a routine exam. PCP = Nalini Calabrese, APRN-CNP    Chief Complaint   Patient presents with    Gynecologic Exam     Patient is here for yearly exam and pap test. Patient does do regular self breast exams and has no concerns at this time. LMP: 3/6/25.          Presents for annual exam. She voices no complaints and is doing well. Denies any bowel or bladder problems. Denies any breast problems.  She is doing well on the progesterone only birth control pill.  Patient is a smoker.    OB History          2    Para   2    Term   1       1    AB        Living   2         SAB        IAB        Ectopic        Multiple        Live Births   2                  Social History     Substance and Sexual Activity   Sexual Activity Yes    Partners: Male    Birth control/protection: OCP       Medical History[1]    Surgical History[2]    Past med hx and past surg hx reviewed and notable for: none    Review of Systems:   Constitutional: No fever or chills  Respiratory: No shortness of breath, or cough  Cardiovascular: No chest pain or syncope  Breasts: No breast pain, no masses, no nipple discharge  Gastrointestinal: No nausea, vomiting, or diarrhea, no abdominal pain  Genitourinary: No dysuria or frequency  Gynecology: Negative except as noted in history of present illness  All other: All other systems reviewed and negative for complaint    Objective   /74   Ht 1.6 m (5' 3\")   Wt 101 kg (222 lb 8 oz)   LMP 2025 (Approximate)   BMI 39.41 kg/m²     PHYSICAL EXAMINATION:  Chaperone present for exam:  Danielle Solis LPN  Well-developed, well nourished, in no acute distress, alert and oriented x three, is pleasant and cooperative.   HEENT: Clear. Pupils equal, round and reactive to light and accommodation. Extraocular muscles are intact. Oral mucosa pink without exudate.   NECK: No lymphadenopathy, no thyromegaly.  BREASTS: Symmetric, no palpable masses. No " nipple discharge or retraction.  LUNGS: Clear bilaterally.  HEART: Regular rate and rhythm without murmurs.  ABDOMEN: Normoactive bowel sounds, soft and nontender, no guarding or rebound tenderness, no CVA tenderness.  EXTREMITIES: No clubbing, cyanosis or edema.  NEUROLOGIC:  Cranial nerves II-XII grossly intact.  :  Normal external female genitalia, normal vulva, normal vagina. Normal urethral meatus, urethra and bladder. Normal appearing cervix. Normal-sized uterus, no adnexal masses or tenderness. Pap smear performed today.      Actions performed during this visit include:  - Clinical breast exam  - Clinical pelvic exam  -   Orders Placed This Encounter   Procedures    BI mammo bilateral screening tomosynthesis     Standing Status:   Future     Expected Date:   4/30/2025     Expiration Date:   5/30/2026     Reason for exam::   Screening     Radiologist to Determine Optimal Study:   Yes     Release result to [employer]:   Immediate [1]     Is this exam part of a Research Study? If Yes, link this order to the research study:   No        Problem List Items Addressed This Visit    None  Visit Diagnoses         Encounter for annual routine gynecological examination        Relevant Orders    THINPREP PAP TEST      Encounter for screening for cervical cancer        Relevant Orders    THINPREP PAP TEST      Encounter for screening mammogram for malignant neoplasm of breast        Relevant Orders    BI mammo bilateral screening tomosynthesis      Encounter for surveillance of contraceptive pills        Relevant Medications    norethindrone (Jencycla) 0.35 mg tablet             Provider Impression:  1.  Annual  2.  Screening mammogram  3.  Contraceptive counseling  Will renew the birth control pills.    Thank you for coming to your annual exam. Your findings during the exam were normal.  Please return for your next visit in 1 year.       [1]   Past Medical History:  Diagnosis Date    Amenorrhea 12/13/2019    History of  amenorrhea    Caffeine use     Chronic pancreatitis (Multi) 2023    Chronic sinusitis 2023    COVID-19 vaccine series declined     Diabetes mellitus (Multi) 2015    Disease of thyroid gland     Fibromyalgia, primary 10/2019    Hypertension 2005    Low back pain 10/2019    Lupus 10/2019    Nausea and vomiting 2024    Other hypersomnia 2021    Excessive daytime sleepiness    Personal history of urinary calculi 2018    History of renal calculi    Secondary pancreatic insufficiency ([facility]) 2023    Spinal stenosis 2022   [2]   Past Surgical History:  Procedure Laterality Date     SECTION, CLASSIC  2019     Section    CHOLECYSTECTOMY  2019    Cholecystotomy    ESOPHAGOGASTRODUODENOSCOPY  2021    Esophagogastroduodenoscopy    IR INJECTION EPIDURAL STEROID N/A 2024    L4-5    IR INJECTION NERVE BLOCK  2022    Medial branch block    LITHOTRIPSY  2019    Lithotripsy    LUMBAR EPIDURAL INJECTION  2022    Epidural steroid injection

## 2025-05-06 ENCOUNTER — APPOINTMENT (OUTPATIENT)
Dept: PAIN MEDICINE | Facility: CLINIC | Age: 45
End: 2025-05-06
Payer: COMMERCIAL

## 2025-05-20 ENCOUNTER — OFFICE VISIT (OUTPATIENT)
Dept: PAIN MEDICINE | Facility: CLINIC | Age: 45
End: 2025-05-20
Payer: COMMERCIAL

## 2025-05-20 ENCOUNTER — TELEPHONE (OUTPATIENT)
Dept: PAIN MEDICINE | Facility: CLINIC | Age: 45
End: 2025-05-20

## 2025-05-20 VITALS
HEART RATE: 80 BPM | WEIGHT: 229 LBS | DIASTOLIC BLOOD PRESSURE: 90 MMHG | RESPIRATION RATE: 16 BRPM | SYSTOLIC BLOOD PRESSURE: 132 MMHG | BODY MASS INDEX: 40.57 KG/M2

## 2025-05-20 DIAGNOSIS — M62.838 MUSCLE SPASM: ICD-10-CM

## 2025-05-20 DIAGNOSIS — M79.7 FIBROMYALGIA: ICD-10-CM

## 2025-05-20 DIAGNOSIS — M47.27 OSTEOARTHRITIS OF SPINE WITH RADICULOPATHY, LUMBOSACRAL REGION: ICD-10-CM

## 2025-05-20 DIAGNOSIS — M54.17 LUMBOSACRAL RADICULOPATHY: ICD-10-CM

## 2025-05-20 LAB
CYTOLOGY CMNT CVX/VAG CYTO-IMP: NORMAL
LAB AP CONTRACEPTIVE HISTORY: NORMAL
LAB AP HPV GENOTYPE QUESTION: YES
LAB AP HPV HR: NORMAL
LABORATORY COMMENT REPORT: NORMAL
LMP START DATE: NORMAL
PATH REPORT.TOTAL CANCER: NORMAL

## 2025-05-20 PROCEDURE — 99213 OFFICE O/P EST LOW 20 MIN: CPT

## 2025-05-20 RX ORDER — PREGABALIN 300 MG/1
300 CAPSULE ORAL 2 TIMES DAILY
Qty: 60 CAPSULE | Refills: 2 | Status: SHIPPED | OUTPATIENT
Start: 2025-05-20

## 2025-05-20 RX ORDER — TIZANIDINE 4 MG/1
8 TABLET ORAL 2 TIMES DAILY PRN
Qty: 120 TABLET | Refills: 2 | Status: SHIPPED | OUTPATIENT
Start: 2025-05-20

## 2025-05-20 RX ORDER — AMITRIPTYLINE HYDROCHLORIDE 25 MG/1
25-50 TABLET, FILM COATED ORAL NIGHTLY
Qty: 60 TABLET | Refills: 2 | Status: SHIPPED | OUTPATIENT
Start: 2025-05-20 | End: 2025-08-18

## 2025-05-20 ASSESSMENT — ENCOUNTER SYMPTOMS
LIGHT-HEADEDNESS: 0
PALPITATIONS: 0
BRUISES/BLEEDS EASILY: 0
DYSPHORIC MOOD: 0
ALLERGIC/IMMUNOLOGIC NEGATIVE: 1
COUGH: 0
WEAKNESS: 0
FACIAL ASYMMETRY: 0
MYALGIAS: 1
WHEEZING: 0
ARTHRALGIAS: 0
BACK PAIN: 1
CONSTITUTIONAL NEGATIVE: 1
ADENOPATHY: 0
EYES NEGATIVE: 1
ENDOCRINE NEGATIVE: 1
SHORTNESS OF BREATH: 0

## 2025-05-20 NOTE — PROGRESS NOTES
Subjective   Patient ID: Ayde Ramírez is a 45 y.o. female who presents for Fibromyalgia (FOLLOW UP ON AMITRIPTLYINE INCREASE SHE STATES SHE TOLERATED THE INCREASE BUT STILL ISN'T SLEEPING, STATES SHE IS STABLE ON THE CURRENT DOSE OF LYRICA, SHE STATES HER PAIN IS STILL THE SAME, IF SHE SITS TOO LONG HER TAILBONE STARTS TO GET A SHARP PAIN THAT GOES INTO HER HIPS AND LEGS, ). SHE STATES WHEN SHE LAYS DOWN SHE GETS SPASMS IN HER LEGS THE TIZANIDINE IS NOT GIVING HER RELIEF AND SHE TRIED FLEXERIL PREVIOUS WHICH STOPPED WORKING, SHE IS TAKING IBUPROFEN PRN, HEAT ON HER BACK FOR RELIEF, SHE STRETCHES DAILY FOR RELIEF, SHE WOULD LIKE TO DISCUSS A TENS UNIT, PAIN SCORE 7/10, MU=64%, ETOH NO  Clementine Gillespie, CMA 05/20/25 10:47 AM     The patient is a 45-year-old female presents today for 3-month follow-up appointment for medication management.  She currently rates her pain a 7/10 across her lower back but also throughout her body, saying that the weather has been contributing to a fibro flare.  In the evenings she gets spasms into her legs.  At her last appointment we had increased her pregabalin and her amitriptyline, she does not have any side effects to these but does not feel as if they are doing enough to help with her pain.  She is also feels as if the tizanidine has stopped helping with the spasms in her legs.  She is still not interested in injections in her lower back, saying it did not help and was too expensive did not get relief.  She is wanting to maintain on medication management.        Review of Systems   Constitutional: Negative.    HENT: Negative.     Eyes: Negative.    Respiratory:  Negative for cough, shortness of breath and wheezing.    Cardiovascular:  Negative for chest pain, palpitations and leg swelling.   Endocrine: Negative.    Genitourinary: Negative.    Musculoskeletal:  Positive for back pain and myalgias. Negative for arthralgias.   Skin: Negative.    Allergic/Immunologic: Negative.     Neurological:  Negative for facial asymmetry, weakness and light-headedness.   Hematological:  Negative for adenopathy. Does not bruise/bleed easily.   Psychiatric/Behavioral:  Negative for dysphoric mood and suicidal ideas.        Objective   Physical Exam  Constitutional:       General: She is not in acute distress.     Appearance: Normal appearance.   HENT:      Head: Normocephalic.      Mouth/Throat:      Mouth: Mucous membranes are moist.   Eyes:      Extraocular Movements: Extraocular movements intact.   Cardiovascular:      Rate and Rhythm: Normal rate and regular rhythm.      Pulses: Normal pulses.      Heart sounds: Normal heart sounds. No murmur heard.     No friction rub. No gallop.   Pulmonary:      Effort: Pulmonary effort is normal.      Breath sounds: Normal breath sounds. No wheezing, rhonchi or rales.   Abdominal:      General: Abdomen is flat.      Palpations: Abdomen is soft.   Musculoskeletal:      Cervical back: Normal range of motion.      Right lower leg: No edema.      Left lower leg: No edema.      Comments: Ambulates without assistance  Strength 5/5 BLE   Lymphadenopathy:      Cervical: No cervical adenopathy.   Skin:     General: Skin is warm and dry.   Neurological:      General: No focal deficit present.      Mental Status: She is alert and oriented to person, place, and time. Mental status is at baseline.   Psychiatric:         Mood and Affect: Mood normal.         Behavior: Behavior normal.         Assessment/Plan   Diagnoses and all orders for this visit:  Fibromyalgia  -     pregabalin (Lyrica) 300 mg capsule; Take 1 capsule (300 mg) by mouth 2 times a day.  -     amitriptyline (Elavil) 25 mg tablet; Take 1-2 tablets (25-50 mg) by mouth once daily at bedtime.  Lumbosacral radiculopathy  -     pregabalin (Lyrica) 300 mg capsule; Take 1 capsule (300 mg) by mouth 2 times a day.  -     amitriptyline (Elavil) 25 mg tablet; Take 1-2 tablets (25-50 mg) by mouth once daily at  bedtime.  Osteoarthritis of spine with radiculopathy, lumbosacral region  -     pregabalin (Lyrica) 300 mg capsule; Take 1 capsule (300 mg) by mouth 2 times a day.  Muscle spasm  -     tiZANidine (Zanaflex) 4 mg tablet; Take 2 tablets (8 mg) by mouth 2 times a day as needed for muscle spasms.       The patient is a 45-year-old female with a past medical history significant for the above-mentioned problems.  We discussed increasing her pregabalin to 300 mg twice a day on an uptitrating schedule, a week or 2 later increasing her amitriptyline to 50 mg nightly, and then increasing her tizanidine to 8 mg twice a day as needed.  Patient is agreeable, side effects discussed, Rx sent.  PDMP reviewed.  We also discussed trialing a TENS unit to see if this can bring any better pain relief overall especially with the muscle spasms and pain in her lower back.  She would like to follow-up with us in 3 months, call the clinic sooner if needed.

## 2025-06-26 DIAGNOSIS — M79.7 FIBROMYALGIA: ICD-10-CM

## 2025-06-26 RX ORDER — DULOXETIN HYDROCHLORIDE 60 MG/1
60 CAPSULE, DELAYED RELEASE ORAL 2 TIMES DAILY
Qty: 180 CAPSULE | Refills: 3 | Status: SHIPPED | OUTPATIENT
Start: 2025-06-26

## 2025-06-27 DIAGNOSIS — I47.19 ATRIAL TACHYCARDIA: ICD-10-CM

## 2025-06-27 DIAGNOSIS — E11.65 TYPE 2 DIABETES MELLITUS WITH HYPERGLYCEMIA, WITHOUT LONG-TERM CURRENT USE OF INSULIN: ICD-10-CM

## 2025-06-27 RX ORDER — DULAGLUTIDE 1.5 MG/.5ML
1.5 INJECTION, SOLUTION SUBCUTANEOUS WEEKLY
Qty: 2 ML | Refills: 2 | Status: SHIPPED | OUTPATIENT
Start: 2025-06-27

## 2025-06-27 RX ORDER — ATENOLOL 50 MG/1
TABLET ORAL
Qty: 270 TABLET | Refills: 1 | Status: SHIPPED | OUTPATIENT
Start: 2025-06-27

## 2025-07-11 ENCOUNTER — APPOINTMENT (OUTPATIENT)
Dept: RHEUMATOLOGY | Facility: CLINIC | Age: 45
End: 2025-07-11
Payer: COMMERCIAL

## 2025-08-01 DIAGNOSIS — E55.9 VITAMIN D DEFICIENCY: ICD-10-CM

## 2025-08-01 DIAGNOSIS — E78.5 MILD HYPERLIPIDEMIA: ICD-10-CM

## 2025-08-01 DIAGNOSIS — E11.65 TYPE 2 DIABETES MELLITUS WITH HYPERGLYCEMIA, WITHOUT LONG-TERM CURRENT USE OF INSULIN: ICD-10-CM

## 2025-08-01 DIAGNOSIS — E53.8 B12 DEFICIENCY: ICD-10-CM

## 2025-08-18 ENCOUNTER — APPOINTMENT (OUTPATIENT)
Dept: PAIN MEDICINE | Facility: CLINIC | Age: 45
End: 2025-08-18
Payer: COMMERCIAL

## 2025-08-29 ENCOUNTER — APPOINTMENT (OUTPATIENT)
Dept: RHEUMATOLOGY | Facility: CLINIC | Age: 45
End: 2025-08-29
Payer: COMMERCIAL

## 2025-08-29 VITALS
DIASTOLIC BLOOD PRESSURE: 77 MMHG | WEIGHT: 231.4 LBS | SYSTOLIC BLOOD PRESSURE: 107 MMHG | TEMPERATURE: 97.9 F | OXYGEN SATURATION: 95 % | HEART RATE: 86 BPM | BODY MASS INDEX: 41 KG/M2 | HEIGHT: 63 IN

## 2025-08-29 DIAGNOSIS — M32.9 SYSTEMIC LUPUS ERYTHEMATOSUS, UNSPECIFIED SLE TYPE, UNSPECIFIED ORGAN INVOLVEMENT STATUS (MULTI): Primary | ICD-10-CM

## 2025-08-29 DIAGNOSIS — D84.821 IMMUNOSUPPRESSION DUE TO DRUG THERAPY: ICD-10-CM

## 2025-08-29 DIAGNOSIS — E11.65 TYPE 2 DIABETES MELLITUS WITH HYPERGLYCEMIA, WITHOUT LONG-TERM CURRENT USE OF INSULIN: ICD-10-CM

## 2025-08-29 DIAGNOSIS — M79.7 FIBROMYALGIA: ICD-10-CM

## 2025-08-29 DIAGNOSIS — Z79.899 IMMUNOSUPPRESSION DUE TO DRUG THERAPY: ICD-10-CM

## 2025-08-29 PROBLEM — D72.829 LEUKOCYTOSIS: Status: RESOLVED | Noted: 2023-08-24 | Resolved: 2025-08-29

## 2025-08-29 PROCEDURE — 3074F SYST BP LT 130 MM HG: CPT | Performed by: INTERNAL MEDICINE

## 2025-08-29 PROCEDURE — 3078F DIAST BP <80 MM HG: CPT | Performed by: INTERNAL MEDICINE

## 2025-08-29 PROCEDURE — 99214 OFFICE O/P EST MOD 30 MIN: CPT | Performed by: INTERNAL MEDICINE

## 2025-08-29 PROCEDURE — 3008F BODY MASS INDEX DOCD: CPT | Performed by: INTERNAL MEDICINE

## 2025-08-29 PROCEDURE — 4004F PT TOBACCO SCREEN RCVD TLK: CPT | Performed by: INTERNAL MEDICINE

## 2025-08-29 ASSESSMENT — ENCOUNTER SYMPTOMS
PSYCHIATRIC NEGATIVE: 1
FEVER: 0
GASTROINTESTINAL NEGATIVE: 1
ENDOCRINE NEGATIVE: 1
SHORTNESS OF BREATH: 0
FATIGUE: 0
COUGH: 0
COLOR CHANGE: 0
BACK PAIN: 0
MYALGIAS: 0
WEAKNESS: 0
JOINT SWELLING: 0
ARTHRALGIAS: 1
EYES NEGATIVE: 1
NUMBNESS: 0

## 2025-08-30 LAB
ALBUMIN SERPL-MCNC: 4.4 G/DL (ref 3.6–5.1)
ALP SERPL-CCNC: 90 U/L (ref 31–125)
ALT SERPL-CCNC: 20 U/L (ref 6–29)
ANION GAP SERPL CALCULATED.4IONS-SCNC: 11 MMOL/L (CALC) (ref 7–17)
AST SERPL-CCNC: 15 U/L (ref 10–30)
BASOPHILS # BLD AUTO: 83 CELLS/UL (ref 0–200)
BASOPHILS NFR BLD AUTO: 0.8 %
BILIRUB SERPL-MCNC: 0.6 MG/DL (ref 0.2–1.2)
BUN SERPL-MCNC: 7 MG/DL (ref 7–25)
C3 SERPL-MCNC: NORMAL MG/DL
C4 SERPL-MCNC: NORMAL MG/DL
CALCIUM SERPL-MCNC: 9.4 MG/DL (ref 8.6–10.2)
CHLORIDE SERPL-SCNC: 105 MMOL/L (ref 98–110)
CO2 SERPL-SCNC: 24 MMOL/L (ref 20–32)
CREAT SERPL-MCNC: 0.76 MG/DL (ref 0.5–0.99)
CRP SERPL-MCNC: NORMAL MG/L
DSDNA AB SER-ACNC: NORMAL [IU]/ML
EGFRCR SERPLBLD CKD-EPI 2021: 100 ML/MIN/1.73M2
EOSINOPHIL # BLD AUTO: 447 CELLS/UL (ref 15–500)
EOSINOPHIL NFR BLD AUTO: 4.3 %
ERYTHROCYTE [DISTWIDTH] IN BLOOD BY AUTOMATED COUNT: 13.8 % (ref 11–15)
ERYTHROCYTE [SEDIMENTATION RATE] IN BLOOD BY WESTERGREN METHOD: 9 MM/H
GLUCOSE SERPL-MCNC: 105 MG/DL (ref 65–99)
HCT VFR BLD AUTO: 45.9 % (ref 35–45)
HGB BLD-MCNC: 15.1 G/DL (ref 11.7–15.5)
LYMPHOCYTES # BLD AUTO: 3598 CELLS/UL (ref 850–3900)
LYMPHOCYTES NFR BLD AUTO: 34.6 %
MCH RBC QN AUTO: 28.6 PG (ref 27–33)
MCHC RBC AUTO-ENTMCNC: 32.9 G/DL (ref 32–36)
MCV RBC AUTO: 86.9 FL (ref 80–100)
MONOCYTES # BLD AUTO: 520 CELLS/UL (ref 200–950)
MONOCYTES NFR BLD AUTO: 5 %
NEUTROPHILS # BLD AUTO: 5751 CELLS/UL (ref 1500–7800)
NEUTROPHILS NFR BLD AUTO: 55.3 %
PLATELET # BLD AUTO: 384 THOUSAND/UL (ref 140–400)
PMV BLD REES-ECKER: 10.3 FL (ref 7.5–12.5)
POTASSIUM SERPL-SCNC: 4.5 MMOL/L (ref 3.5–5.3)
PROT SERPL-MCNC: 6.8 G/DL (ref 6.1–8.1)
RBC # BLD AUTO: 5.28 MILLION/UL (ref 3.8–5.1)
SODIUM SERPL-SCNC: 140 MMOL/L (ref 135–146)
WBC # BLD AUTO: 10.4 THOUSAND/UL (ref 3.8–10.8)

## 2025-09-02 LAB
ALBUMIN SERPL-MCNC: 4.4 G/DL (ref 3.6–5.1)
ALP SERPL-CCNC: 90 U/L (ref 31–125)
ALT SERPL-CCNC: 20 U/L (ref 6–29)
ANION GAP SERPL CALCULATED.4IONS-SCNC: 11 MMOL/L (CALC) (ref 7–17)
AST SERPL-CCNC: 15 U/L (ref 10–30)
BASOPHILS # BLD AUTO: 83 CELLS/UL (ref 0–200)
BASOPHILS NFR BLD AUTO: 0.8 %
BILIRUB SERPL-MCNC: 0.6 MG/DL (ref 0.2–1.2)
BUN SERPL-MCNC: 7 MG/DL (ref 7–25)
C3 SERPL-MCNC: 182 MG/DL (ref 83–193)
C4 SERPL-MCNC: 33 MG/DL (ref 15–57)
CALCIUM SERPL-MCNC: 9.4 MG/DL (ref 8.6–10.2)
CHLORIDE SERPL-SCNC: 105 MMOL/L (ref 98–110)
CO2 SERPL-SCNC: 24 MMOL/L (ref 20–32)
CREAT SERPL-MCNC: 0.76 MG/DL (ref 0.5–0.99)
CRP SERPL-MCNC: 7.3 MG/L
DSDNA AB SER-ACNC: <1 IU/ML
EGFRCR SERPLBLD CKD-EPI 2021: 100 ML/MIN/1.73M2
EOSINOPHIL # BLD AUTO: 447 CELLS/UL (ref 15–500)
EOSINOPHIL NFR BLD AUTO: 4.3 %
ERYTHROCYTE [DISTWIDTH] IN BLOOD BY AUTOMATED COUNT: 13.8 % (ref 11–15)
ERYTHROCYTE [SEDIMENTATION RATE] IN BLOOD BY WESTERGREN METHOD: 9 MM/H
GLUCOSE SERPL-MCNC: 105 MG/DL (ref 65–99)
HCT VFR BLD AUTO: 45.9 % (ref 35–45)
HGB BLD-MCNC: 15.1 G/DL (ref 11.7–15.5)
LYMPHOCYTES # BLD AUTO: 3598 CELLS/UL (ref 850–3900)
LYMPHOCYTES NFR BLD AUTO: 34.6 %
MCH RBC QN AUTO: 28.6 PG (ref 27–33)
MCHC RBC AUTO-ENTMCNC: 32.9 G/DL (ref 32–36)
MCV RBC AUTO: 86.9 FL (ref 80–100)
MONOCYTES # BLD AUTO: 520 CELLS/UL (ref 200–950)
MONOCYTES NFR BLD AUTO: 5 %
NEUTROPHILS # BLD AUTO: 5751 CELLS/UL (ref 1500–7800)
NEUTROPHILS NFR BLD AUTO: 55.3 %
PLATELET # BLD AUTO: 384 THOUSAND/UL (ref 140–400)
PMV BLD REES-ECKER: 10.3 FL (ref 7.5–12.5)
POTASSIUM SERPL-SCNC: 4.5 MMOL/L (ref 3.5–5.3)
PROT SERPL-MCNC: 6.8 G/DL (ref 6.1–8.1)
RBC # BLD AUTO: 5.28 MILLION/UL (ref 3.8–5.1)
SODIUM SERPL-SCNC: 140 MMOL/L (ref 135–146)
WBC # BLD AUTO: 10.4 THOUSAND/UL (ref 3.8–10.8)

## 2025-10-03 ENCOUNTER — APPOINTMENT (OUTPATIENT)
Dept: PRIMARY CARE | Facility: CLINIC | Age: 45
End: 2025-10-03
Payer: COMMERCIAL

## 2026-01-09 ENCOUNTER — APPOINTMENT (OUTPATIENT)
Dept: RHEUMATOLOGY | Facility: CLINIC | Age: 46
End: 2026-01-09
Payer: COMMERCIAL

## 2026-05-04 ENCOUNTER — APPOINTMENT (OUTPATIENT)
Facility: CLINIC | Age: 46
End: 2026-05-04
Payer: COMMERCIAL